# Patient Record
Sex: FEMALE | Race: WHITE | Employment: FULL TIME | URBAN - METROPOLITAN AREA
[De-identification: names, ages, dates, MRNs, and addresses within clinical notes are randomized per-mention and may not be internally consistent; named-entity substitution may affect disease eponyms.]

---

## 2017-05-31 ENCOUNTER — OFFICE VISIT (OUTPATIENT)
Dept: INTERNAL MEDICINE CLINIC | Facility: CLINIC | Age: 42
End: 2017-05-31

## 2017-05-31 VITALS
RESPIRATION RATE: 16 BRPM | HEIGHT: 70 IN | TEMPERATURE: 98 F | HEART RATE: 92 BPM | DIASTOLIC BLOOD PRESSURE: 74 MMHG | WEIGHT: 257 LBS | SYSTOLIC BLOOD PRESSURE: 112 MMHG | BODY MASS INDEX: 36.79 KG/M2

## 2017-05-31 DIAGNOSIS — Z86.711 HISTORY OF PULMONARY EMBOLUS (PE): ICD-10-CM

## 2017-05-31 DIAGNOSIS — F41.9 ANXIETY: ICD-10-CM

## 2017-05-31 DIAGNOSIS — G56.03 BILATERAL CARPAL TUNNEL SYNDROME: ICD-10-CM

## 2017-05-31 DIAGNOSIS — O24.419 GESTATIONAL DIABETES MELLITUS (GDM), ANTEPARTUM, GESTATIONAL DIABETES METHOD OF CONTROL UNSPECIFIED: ICD-10-CM

## 2017-05-31 DIAGNOSIS — R19.5 LOOSE STOOLS: ICD-10-CM

## 2017-05-31 DIAGNOSIS — R53.83 FATIGUE, UNSPECIFIED TYPE: ICD-10-CM

## 2017-05-31 DIAGNOSIS — Z00.00 ANNUAL PHYSICAL EXAM: Primary | ICD-10-CM

## 2017-05-31 PROCEDURE — 99386 PREV VISIT NEW AGE 40-64: CPT | Performed by: INTERNAL MEDICINE

## 2017-05-31 PROCEDURE — 99213 OFFICE O/P EST LOW 20 MIN: CPT | Performed by: INTERNAL MEDICINE

## 2017-05-31 RX ORDER — ALPRAZOLAM 0.25 MG/1
0.25 TABLET ORAL NIGHTLY PRN
Qty: 20 TABLET | Refills: 0 | Status: SHIPPED | OUTPATIENT
Start: 2017-05-31 | End: 2017-10-27

## 2017-05-31 RX ORDER — BUPROPION HYDROCHLORIDE 150 MG/1
150 TABLET ORAL DAILY
Qty: 30 TABLET | Refills: 0 | Status: SHIPPED | OUTPATIENT
Start: 2017-05-31 | End: 2017-10-16

## 2017-05-31 NOTE — PROGRESS NOTES
Jared Sigala is a 38year old female. Patient presents with:  Establish Care: Pt goes to [facility] for paps. Depression: Does not feel that her sertraline is working for her since last pregnancy.       HPI:   Jared Sigala is a 38year old female who pr reports loose BM every 3-4 days requiring imodium. Denies melena, hematochezia. This has been ongoing for 6 weeks. 5) reports L shoulder numbness ever since receiving a flu shot last year.      Wt Readings from Last 6 Encounters:  05/31/17 : 257 lb (116.5 vision  HEENT: denies nasal congestion, sinus pain, ST, sore throat  LUNGS: denies shortness of breath with exertion, cough or wheezing  BREAST: denies masses or nipple discharge  CARDIOVASCULAR: denies chest pain, pressure, or palpitations  GI: denies abd mouth daily. Dispense: 30 tablet; Refill: 0  - TSH W Reflex To Free T4 [E]; Future    4. Overweight  Discussed emphasis on protein and vegetable/fruit intake; cut down on carbohydrates (sweets, pasta, rice, tortillas, cereal, bread).  She will work on this

## 2017-06-03 ENCOUNTER — LAB ENCOUNTER (OUTPATIENT)
Dept: LAB | Facility: HOSPITAL | Age: 42
End: 2017-06-03
Attending: INTERNAL MEDICINE
Payer: COMMERCIAL

## 2017-06-03 DIAGNOSIS — F41.9 ANXIETY: ICD-10-CM

## 2017-06-03 DIAGNOSIS — Z00.00 ANNUAL PHYSICAL EXAM: ICD-10-CM

## 2017-06-03 DIAGNOSIS — R53.83 FATIGUE, UNSPECIFIED TYPE: ICD-10-CM

## 2017-06-03 DIAGNOSIS — O24.419 GESTATIONAL DIABETES MELLITUS (GDM), ANTEPARTUM, GESTATIONAL DIABETES METHOD OF CONTROL UNSPECIFIED: ICD-10-CM

## 2017-06-03 PROCEDURE — 85025 COMPLETE CBC W/AUTO DIFF WBC: CPT

## 2017-06-03 PROCEDURE — 36415 COLL VENOUS BLD VENIPUNCTURE: CPT

## 2017-06-03 PROCEDURE — 84443 ASSAY THYROID STIM HORMONE: CPT

## 2017-06-03 PROCEDURE — 80061 LIPID PANEL: CPT

## 2017-06-03 PROCEDURE — 80053 COMPREHEN METABOLIC PANEL: CPT

## 2017-06-04 ENCOUNTER — TELEPHONE (OUTPATIENT)
Dept: INTERNAL MEDICINE CLINIC | Facility: CLINIC | Age: 42
End: 2017-06-04

## 2017-06-05 NOTE — TELEPHONE ENCOUNTER
Please notify patient that her labs look good. Her cholesterol is high, but we did discuss making some changes, so this should get better.  Would cut back on red meats, fried/fatty foods, cheeses

## 2017-07-28 ENCOUNTER — OFFICE VISIT (OUTPATIENT)
Dept: INTERNAL MEDICINE CLINIC | Facility: CLINIC | Age: 42
End: 2017-07-28

## 2017-07-28 VITALS
WEIGHT: 253 LBS | HEIGHT: 70 IN | RESPIRATION RATE: 16 BRPM | TEMPERATURE: 99 F | HEART RATE: 88 BPM | DIASTOLIC BLOOD PRESSURE: 66 MMHG | BODY MASS INDEX: 36.22 KG/M2 | SYSTOLIC BLOOD PRESSURE: 122 MMHG

## 2017-07-28 DIAGNOSIS — E66.09 OBESITY DUE TO EXCESS CALORIES WITHOUT SERIOUS COMORBIDITY, UNSPECIFIED CLASSIFICATION: Primary | ICD-10-CM

## 2017-07-28 PROCEDURE — 99212 OFFICE O/P EST SF 10 MIN: CPT | Performed by: INTERNAL MEDICINE

## 2017-07-28 PROCEDURE — 99213 OFFICE O/P EST LOW 20 MIN: CPT | Performed by: INTERNAL MEDICINE

## 2017-07-28 NOTE — PROGRESS NOTES
Maryjane Parada is a 38year old female. Patient presents with:  Overweight: Patient present for follow up from       HPI:   Maryjane Parada is a 38year old female who presents for: follow up on weight management    States she did go on vacation twic Paternal Uncle 48      Social History:   Smoking status: Former Smoker                                                              Packs/day: 0.00      Years: 0.00         Types: Cigarettes     Quit date: 1/1/2008  Smokeless tobacco: Never Used

## 2017-09-05 ENCOUNTER — PATIENT MESSAGE (OUTPATIENT)
Dept: INTERNAL MEDICINE CLINIC | Facility: CLINIC | Age: 42
End: 2017-09-05

## 2017-09-05 NOTE — TELEPHONE ENCOUNTER
From: Jared Sigala  To: Yahaira Kevin DO  Sent: 9/5/2017 9:23 AM CDT  Subject: Prescription Question    Hi Dr. Amadou Nguyen,    I wanted to reach out because I started the change in my Zoloft about a month ago to transition to the Wellbutrin.  I dropped to 50mg

## 2017-10-16 DIAGNOSIS — F41.9 ANXIETY: ICD-10-CM

## 2017-10-17 NOTE — TELEPHONE ENCOUNTER
From: Reggie Yoder  Sent: 10/16/2017 7:26 PM CDT  Subject: Medication Renewal Request    Linda Bhatia would like a refill of the following medications:     BuPROPion HCl ER, XL, 150 MG Oral Tablet 24 Hr Ryan Carranza, DO]    Preferred pharmacy: [facility], [address]  [address] [place] [address], [phone], [phone]    [facility]

## 2017-10-18 ENCOUNTER — TELEPHONE (OUTPATIENT)
Dept: INTERNAL MEDICINE CLINIC | Facility: CLINIC | Age: 42
End: 2017-10-18

## 2017-10-19 RX ORDER — BUPROPION HYDROCHLORIDE 150 MG/1
150 TABLET ORAL DAILY
Qty: 30 TABLET | Refills: 0 | Status: SHIPPED
Start: 2017-10-19 | End: 2017-10-27

## 2017-10-19 NOTE — TELEPHONE ENCOUNTER
Pt called for status, pt will run out of medication tonight would like to  refill today  Pt uses [facility] as pharmacy  Tasked to [employer]

## 2017-10-27 ENCOUNTER — OFFICE VISIT (OUTPATIENT)
Dept: INTERNAL MEDICINE CLINIC | Facility: CLINIC | Age: 42
End: 2017-10-27

## 2017-10-27 VITALS
SYSTOLIC BLOOD PRESSURE: 102 MMHG | DIASTOLIC BLOOD PRESSURE: 70 MMHG | WEIGHT: 250 LBS | TEMPERATURE: 98 F | HEIGHT: 70 IN | OXYGEN SATURATION: 98 % | BODY MASS INDEX: 35.79 KG/M2 | HEART RATE: 89 BPM

## 2017-10-27 DIAGNOSIS — F41.9 ANXIETY: ICD-10-CM

## 2017-10-27 PROCEDURE — 99212 OFFICE O/P EST SF 10 MIN: CPT | Performed by: INTERNAL MEDICINE

## 2017-10-27 PROCEDURE — 99213 OFFICE O/P EST LOW 20 MIN: CPT | Performed by: INTERNAL MEDICINE

## 2017-10-27 RX ORDER — ALPRAZOLAM 0.25 MG/1
0.25 TABLET ORAL NIGHTLY PRN
Qty: 20 TABLET | Refills: 0 | Status: SHIPPED | OUTPATIENT
Start: 2017-10-27 | End: 2021-04-19 | Stop reason: DRUGHIGH

## 2017-10-27 RX ORDER — ESCITALOPRAM OXALATE 5 MG/1
TABLET ORAL
Qty: 60 TABLET | Refills: 0 | Status: SHIPPED | OUTPATIENT
Start: 2017-10-27 | End: 2018-01-10

## 2017-10-27 NOTE — PROGRESS NOTES
Vincent Mcmillan is a 38year old female. Patient presents with:  Checkup: 3 mo.  Pt has no complaints at this time      HPI:   Vincent Mcmillan is a 38year old female who presents for: 3 month check up    Does not feel like she is doing well on wellbutri Packs/day: 0.00      Years: 0.00         Types: Cigarettes     Quit date: 1/1/2008  Smokeless tobacco: Never Used                      Alcohol use:  Yes              Comment: 1-2 beers a week         REVIEW OF SYSTEMS:   GENERAL: feels wel <<----- Click to add NO pertinent Past Medical History No pertinent past medical history

## 2017-11-04 ENCOUNTER — HOSPITAL ENCOUNTER (OUTPATIENT)
Dept: MAMMOGRAPHY | Facility: HOSPITAL | Age: 42
Discharge: HOME | End: 2017-11-04
Attending: OBSTETRICS & GYNECOLOGY
Payer: COMMERCIAL

## 2017-11-04 ENCOUNTER — HOSPITAL ENCOUNTER (OUTPATIENT)
Dept: ULTRASOUND IMAGING | Facility: HOSPITAL | Age: 42
Discharge: HOME | End: 2017-11-04
Attending: OBSTETRICS & GYNECOLOGY
Payer: COMMERCIAL

## 2017-11-04 DIAGNOSIS — Z12.31 ENCOUNTER FOR SCREENING MAMMOGRAM FOR MALIGNANT NEOPLASM OF BREAST: ICD-10-CM

## 2017-11-04 DIAGNOSIS — N92.0 EXCESSIVE AND FREQUENT MENSTRUATION: ICD-10-CM

## 2017-11-04 PROCEDURE — 77067 SCR MAMMO BI INCL CAD: CPT | Performed by: OBSTETRICS & GYNECOLOGY

## 2017-11-04 PROCEDURE — 76856 US EXAM PELVIC COMPLETE: CPT | Performed by: OBSTETRICS & GYNECOLOGY

## 2017-11-04 PROCEDURE — 76830 TRANSVAGINAL US NON-OB: CPT | Performed by: OBSTETRICS & GYNECOLOGY

## 2017-11-24 ENCOUNTER — HOSPITAL ENCOUNTER (OUTPATIENT)
Dept: MAMMOGRAPHY | Facility: HOSPITAL | Age: 42
Discharge: HOME | End: 2017-11-24
Attending: OBSTETRICS & GYNECOLOGY
Payer: COMMERCIAL

## 2017-11-24 DIAGNOSIS — R92.8 ABNORMAL MAMMOGRAM: ICD-10-CM

## 2017-11-24 PROCEDURE — 77065 DX MAMMO INCL CAD UNI: CPT | Performed by: OBSTETRICS & GYNECOLOGY

## 2018-01-08 ENCOUNTER — PATIENT MESSAGE (OUTPATIENT)
Dept: INTERNAL MEDICINE CLINIC | Facility: CLINIC | Age: 43
End: 2018-01-08

## 2018-01-09 NOTE — TELEPHONE ENCOUNTER
From: Jennifer Eaton  To: Keisha Erwin DO  Sent: 1/8/2018 10:30 PM CST  Subject: Prescription Question    Hi Dr. Elva Meier,    I wanted to follow up with you regarding the prescription change from Wellbutrin to Lexapro.    Dr. Earline Buck also put me on the [facility]

## 2018-01-10 ENCOUNTER — PATIENT MESSAGE (OUTPATIENT)
Dept: INTERNAL MEDICINE CLINIC | Facility: CLINIC | Age: 43
End: 2018-01-10

## 2018-01-10 RX ORDER — ESCITALOPRAM OXALATE 10 MG/1
10 TABLET ORAL DAILY
Qty: 90 TABLET | Refills: 3 | Status: SHIPPED | OUTPATIENT
Start: 2018-01-10 | End: 2019-05-08 | Stop reason: ALTCHOICE

## 2018-01-10 NOTE — TELEPHONE ENCOUNTER
From: Nafisa Sharp  To: Fiorella Gonzalez DO  Sent: 1/10/2018 1:04 PM CST  Subject: Prescription Question    Hi Dr. Mara Cervantes,    I have just been taking the 5mg. once a day. Trying to remember to take anything twice a day would be a challenge.     Saida Andrade

## 2018-01-18 ENCOUNTER — TELEPHONE (OUTPATIENT)
Dept: INTERNAL MEDICINE CLINIC | Facility: CLINIC | Age: 43
End: 2018-01-18

## 2018-01-19 NOTE — TELEPHONE ENCOUNTER
Pt returned call, left message on answering machine  Uses [facility] as pharmacy  Please call at [phone]  Tasked to [address] [place] [address]

## 2018-04-25 ENCOUNTER — TELEPHONE (OUTPATIENT)
Dept: INTERNAL MEDICINE CLINIC | Facility: CLINIC | Age: 43
End: 2018-04-25

## 2018-04-25 RX ORDER — AZITHROMYCIN 250 MG/1
TABLET, FILM COATED ORAL
Qty: 6 TABLET | Refills: 0 | Status: SHIPPED | OUTPATIENT
Start: 2018-04-25 | End: 2019-05-08 | Stop reason: ALTCHOICE

## 2018-04-25 NOTE — TELEPHONE ENCOUNTER
Please advise - called patient who stated she started with congestion about 2 weeks  Ago , now traveling into chest with green phlegm , denies fever, just scratchy throat . She is leaving for [employer] in a few days - to DR. Maria M Moreno

## 2018-04-25 NOTE — TELEPHONE ENCOUNTER
Pt. Called stating she is very congested. Coughing up green phlegm. Not feeling well. Leaving is a few days for [employer]. Asking if there is something she can take. Can Dr. Karoline Merlin? She uses [facility] in [place]. Pt. Can be reached at [phone].

## 2018-12-22 ENCOUNTER — HOSPITAL ENCOUNTER (OUTPATIENT)
Dept: MAMMOGRAPHY | Facility: HOSPITAL | Age: 43
Discharge: HOME | End: 2018-12-22
Attending: OBSTETRICS & GYNECOLOGY
Payer: COMMERCIAL

## 2018-12-22 DIAGNOSIS — Z12.31 ENCOUNTER FOR SCREENING MAMMOGRAM FOR MALIGNANT NEOPLASM OF BREAST: ICD-10-CM

## 2018-12-22 PROCEDURE — 77063 BREAST TOMOSYNTHESIS BI: CPT | Performed by: OBSTETRICS & GYNECOLOGY

## 2018-12-22 PROCEDURE — 77067 SCR MAMMO BI INCL CAD: CPT | Performed by: OBSTETRICS & GYNECOLOGY

## 2019-05-04 ENCOUNTER — APPOINTMENT (OUTPATIENT)
Dept: GENERAL RADIOLOGY | Facility: HOSPITAL | Age: 44
End: 2019-05-04
Payer: COMMERCIAL

## 2019-05-04 ENCOUNTER — HOSPITAL ENCOUNTER (EMERGENCY)
Facility: HOSPITAL | Age: 44
Discharge: HOME | End: 2019-05-04
Attending: EMERGENCY MEDICINE
Payer: COMMERCIAL

## 2019-05-04 VITALS
SYSTOLIC BLOOD PRESSURE: 143 MMHG | RESPIRATION RATE: 18 BRPM | WEIGHT: 260 LBS | HEIGHT: 70 IN | HEART RATE: 104 BPM | DIASTOLIC BLOOD PRESSURE: 86 MMHG | BODY MASS INDEX: 37.22 KG/M2 | OXYGEN SATURATION: 97 % | TEMPERATURE: 98 F

## 2019-05-04 DIAGNOSIS — S92.354A CLOSED NONDISPLACED FRACTURE OF FIFTH METATARSAL BONE OF RIGHT FOOT, INITIAL ENCOUNTER: Primary | ICD-10-CM

## 2019-05-04 PROCEDURE — 99284 EMERGENCY DEPT VISIT MOD MDM: CPT

## 2019-05-04 PROCEDURE — 28470 CLTX METATARSAL FX WO MNP EA: CPT

## 2019-05-04 PROCEDURE — 73630 X-RAY EXAM OF FOOT: CPT | Performed by: EMERGENCY MEDICINE

## 2019-05-04 RX ORDER — LAMOTRIGINE 25 MG/1
25 TABLET ORAL DAILY
COMMUNITY
End: 2020-02-14

## 2019-05-04 RX ORDER — ACETAMINOPHEN 500 MG
1000 TABLET ORAL ONCE
Status: COMPLETED | OUTPATIENT
Start: 2019-05-04 | End: 2019-05-04

## 2019-05-04 RX ORDER — SERTRALINE HYDROCHLORIDE 100 MG/1
100 TABLET, FILM COATED ORAL DAILY
COMMUNITY

## 2019-05-07 ENCOUNTER — TELEPHONE (OUTPATIENT)
Dept: PODIATRY CLINIC | Facility: CLINIC | Age: 44
End: 2019-05-07

## 2019-05-07 ENCOUNTER — OFFICE VISIT (OUTPATIENT)
Dept: PODIATRY CLINIC | Facility: CLINIC | Age: 44
End: 2019-05-07
Payer: COMMERCIAL

## 2019-05-07 DIAGNOSIS — S92.354A CLOSED NONDISPLACED FRACTURE OF FIFTH METATARSAL BONE OF RIGHT FOOT, INITIAL ENCOUNTER: Primary | ICD-10-CM

## 2019-05-07 PROCEDURE — 99212 OFFICE O/P EST SF 10 MIN: CPT | Performed by: PODIATRIST

## 2019-05-07 PROCEDURE — 99203 OFFICE O/P NEW LOW 30 MIN: CPT | Performed by: PODIATRIST

## 2019-05-07 NOTE — PROGRESS NOTES
HPI:    Patient ID: Leah Mejias is a 41year old female. This 82-year-old female presents as a new patient to me on referral from the emergency room. Patient is here because some she is been told that she has a fracture of her right foot.   History right foot. Based on appropriate location and position we will keep this patient in the posterior splint with nonweightbearing utilizing crutches for an additional week.   When I see her in a week I anticipate removing this dressing and then place her in a

## 2019-05-07 NOTE — TELEPHONE ENCOUNTER
Pt was seen today, states big toe looks black/blue and could barely move toe, states an hour ago she was able to move toe, states this was not addressed at [address] Pt requesting to speak to RN. Pls call thank you.

## 2019-05-07 NOTE — TELEPHONE ENCOUNTER
S/w pt and she states after she left appt she started to have pain in right hallux 9/10 if she moves the toe upward. She states there is some bruising at the base of the 2nd, 3rd, 4th toes. She states mild swelling.  She is in splint from ER and loosened up

## 2019-05-08 ENCOUNTER — TELEPHONE (OUTPATIENT)
Dept: PODIATRY CLINIC | Facility: CLINIC | Age: 44
End: 2019-05-08

## 2019-05-08 ENCOUNTER — OFFICE VISIT (OUTPATIENT)
Dept: PODIATRY CLINIC | Facility: CLINIC | Age: 44
End: 2019-05-08
Payer: COMMERCIAL

## 2019-05-08 DIAGNOSIS — S92.354D CLOSED NONDISPLACED FRACTURE OF FIFTH METATARSAL BONE OF RIGHT FOOT WITH ROUTINE HEALING, SUBSEQUENT ENCOUNTER: Primary | ICD-10-CM

## 2019-05-08 PROCEDURE — 99213 OFFICE O/P EST LOW 20 MIN: CPT | Performed by: PODIATRIST

## 2019-05-08 PROCEDURE — L4386 NON-PNEUM WALK BOOT PRE CST: HCPCS | Performed by: PODIATRIST

## 2019-05-08 PROCEDURE — 99212 OFFICE O/P EST SF 10 MIN: CPT | Performed by: PODIATRIST

## 2019-05-08 RX ORDER — ASPIRIN 325 MG
TABLET ORAL
COMMUNITY
End: 2019-05-08

## 2019-05-08 RX ORDER — METHYLPREDNISOLONE 4 MG/1
TABLET ORAL
Qty: 1 PACKAGE | Refills: 0 | Status: SHIPPED | OUTPATIENT
Start: 2019-05-08 | End: 2020-02-14

## 2019-05-08 NOTE — TELEPHONE ENCOUNTER
Pt called and states right big toe pain is worse today, some swelling. She tried taking advil, icing and elevating and not helping. Offered appt today @ 1010am [facility]. SCR notified.

## 2019-05-08 NOTE — TELEPHONE ENCOUNTER
The rx was sent a couple hours ago? You may give her  a note but pleas be certain that it indicates that he is caring for his wife.  Thank you scr

## 2019-05-08 NOTE — TELEPHONE ENCOUNTER
Pt states she went to pharmacy and rx SCR was supposed to be prescribed was not there. Also states spouse is taking family leave to assist pt due to broken foot, asking for note for work to excuse him until 5/14. Pls advise thank you.

## 2019-05-08 NOTE — TELEPHONE ENCOUNTER
S/w pt and she rc'd medrol raz. Informed her I would send her letter through [employer] for her spouse fredy.

## 2019-05-08 NOTE — PROGRESS NOTES
HPI:    Patient ID: Moreno Ibarra is a 41year old female. 46-year-old female called last evening and then again this morning with pain associated with the right great toe.   She is not aware that it was a problem when I saw her earlier in the day yest metatarsophalangeal joint she has discomfort in that immediate area. I did review x-ray and see no apparent bone pathology.   I fitted this patient for a cam walker and encouraged partial weightbearing with crutches and a cam walker and instructed icing an

## 2019-05-09 ENCOUNTER — TELEPHONE (OUTPATIENT)
Dept: PODIATRY CLINIC | Facility: CLINIC | Age: 44
End: 2019-05-09

## 2019-05-09 RX ORDER — HYDROCODONE BITARTRATE AND ACETAMINOPHEN 5; 325 MG/1; MG/1
1 TABLET ORAL EVERY 4 HOURS PRN
Qty: 20 TABLET | Refills: 0 | Status: SHIPPED | OUTPATIENT
Start: 2019-05-09 | End: 2020-02-14

## 2019-05-09 NOTE — TELEPHONE ENCOUNTER
Per [facility] request a Rx for Norco 5 mg # 20 was printed out and signed by [facility] and awaits patient  to pick it up at the [place] office [address].  Nory Martinez

## 2019-05-09 NOTE — TELEPHONE ENCOUNTER
Spoke with Mady Ly and informed her of the signed Rx and she stated her  Diana Bedoya will  the Rx this morning, she was told that he will need a picture ID to  the Rx and she verbalized understanding .  She stated she will start the medrol dose pa

## 2019-05-09 NOTE — ED PROVIDER NOTES
Patient Seen in: [facility] AND [facility] Emergency Department    History   Patient presents with: Foot Injury    Stated Complaint: right foot injury    HPI    57-year-old female presents for evaluation of right foot pain.   Patient reports she missed stopped a normal. No respiratory distress. Musculoskeletal:   Tender at the base of the fifth metatarsal   Neurological: She is alert and oriented to person, place, and time. No sensory deficit. No focal deficit   Skin: Skin is warm and dry.  Capillary refill avila Considerations: Fracture, dislocation, contusion, sprain    Limitations of history:   able to obtain history from patient  Factors limiting our ability to obtain a history: None    Medical Record Review: I personally reviewed available prior medical record

## 2019-05-20 ENCOUNTER — TELEPHONE (OUTPATIENT)
Dept: PODIATRY CLINIC | Facility: CLINIC | Age: 44
End: 2019-05-20

## 2019-05-20 NOTE — TELEPHONE ENCOUNTER
S/w pt and she states the medrol raz helped her great toe pain.  She has been putting partial weight in boot the past 3 days and it has caused increase in throbbing pain 6/10, she still has moderate swelling in ankle and wondering if that is normal? Pt has

## 2019-05-20 NOTE — TELEPHONE ENCOUNTER
S/w SCR and he states if pt having pain with partial WB to be NWB for a couple days and then try again. Called pt and informed her per [employer] directions and she will CB if she needs a work note.

## 2019-05-20 NOTE — TELEPHONE ENCOUNTER
The swelling is assoc with the fracture. If she is not able to get to work she may have a note to renin out for another week or 2 as needed.  scr

## 2019-05-20 NOTE — TELEPHONE ENCOUNTER
Pt states she has some swelling in her foot and has some pain from putting a little pressure on her foot.  States she wants to make sure swelling and pain is normal. Also states she has questions about when she can walk and needs some info about when she ca

## 2019-05-31 ENCOUNTER — OFFICE VISIT (OUTPATIENT)
Dept: PODIATRY CLINIC | Facility: CLINIC | Age: 44
End: 2019-05-31
Payer: COMMERCIAL

## 2019-05-31 ENCOUNTER — HOSPITAL ENCOUNTER (OUTPATIENT)
Dept: GENERAL RADIOLOGY | Facility: HOSPITAL | Age: 44
Discharge: HOME | End: 2019-05-31
Attending: PODIATRIST
Payer: COMMERCIAL

## 2019-05-31 DIAGNOSIS — Z47.89 ORTHOPEDIC AFTERCARE: ICD-10-CM

## 2019-05-31 DIAGNOSIS — S92.354D CLOSED NONDISPLACED FRACTURE OF FIFTH METATARSAL BONE OF RIGHT FOOT WITH ROUTINE HEALING, SUBSEQUENT ENCOUNTER: ICD-10-CM

## 2019-05-31 DIAGNOSIS — Z47.89 ORTHOPEDIC AFTERCARE: Primary | ICD-10-CM

## 2019-05-31 PROCEDURE — 99212 OFFICE O/P EST SF 10 MIN: CPT | Performed by: PODIATRIST

## 2019-05-31 PROCEDURE — 99213 OFFICE O/P EST LOW 20 MIN: CPT | Performed by: PODIATRIST

## 2019-05-31 PROCEDURE — 73630 X-RAY EXAM OF FOOT: CPT | Performed by: PODIATRIST

## 2019-05-31 NOTE — PROGRESS NOTES
HPI:    Patient ID: Bolivar Roberts is a 45year old female. This 53-year-old female presents for follow-up in reference to the fracture of the fifth metatarsal base of the right foot. Patient is doing extremely well and progressing nicely.     ROS: Prescriptions      No prescriptions requested or ordered in this encounter       Imaging & Referrals:  None       CC#[id number]

## 2019-06-14 ENCOUNTER — HOSPITAL ENCOUNTER (OUTPATIENT)
Dept: GENERAL RADIOLOGY | Facility: HOSPITAL | Age: 44
Discharge: HOME | End: 2019-06-14
Attending: PODIATRIST
Payer: COMMERCIAL

## 2019-06-14 ENCOUNTER — OFFICE VISIT (OUTPATIENT)
Dept: PODIATRY CLINIC | Facility: CLINIC | Age: 44
End: 2019-06-14
Payer: COMMERCIAL

## 2019-06-14 DIAGNOSIS — T14.8XXA FRACTURE: ICD-10-CM

## 2019-06-14 DIAGNOSIS — T14.8XXA FRACTURE: Primary | ICD-10-CM

## 2019-06-14 DIAGNOSIS — S92.354D CLOSED NONDISPLACED FRACTURE OF FIFTH METATARSAL BONE OF RIGHT FOOT WITH ROUTINE HEALING, SUBSEQUENT ENCOUNTER: ICD-10-CM

## 2019-06-14 PROCEDURE — 99212 OFFICE O/P EST SF 10 MIN: CPT | Performed by: PODIATRIST

## 2019-06-14 PROCEDURE — 73630 X-RAY EXAM OF FOOT: CPT | Performed by: PODIATRIST

## 2019-06-14 PROCEDURE — 99213 OFFICE O/P EST LOW 20 MIN: CPT | Performed by: PODIATRIST

## 2019-06-14 NOTE — PROGRESS NOTES
HPI:    Patient ID: Jabari Rashid is a 45year old female. This 17-year-old female presents for follow-up in reference to the fracture of the fifth metatarsal base right foot.   Patient has minimal discomfort and is extremely active while wearing the c

## 2019-06-21 ENCOUNTER — OFFICE VISIT (OUTPATIENT)
Dept: PODIATRY CLINIC | Facility: CLINIC | Age: 44
End: 2019-06-21
Payer: COMMERCIAL

## 2019-06-21 DIAGNOSIS — S92.354D CLOSED NONDISPLACED FRACTURE OF FIFTH METATARSAL BONE OF RIGHT FOOT WITH ROUTINE HEALING, SUBSEQUENT ENCOUNTER: Primary | ICD-10-CM

## 2019-06-21 PROCEDURE — 99213 OFFICE O/P EST LOW 20 MIN: CPT | Performed by: PODIATRIST

## 2019-06-21 PROCEDURE — 99212 OFFICE O/P EST SF 10 MIN: CPT | Performed by: PODIATRIST

## 2019-06-21 RX ORDER — MELOXICAM 15 MG/1
TABLET ORAL
Qty: 30 TABLET | Refills: 1 | Status: SHIPPED | OUTPATIENT
Start: 2019-06-21 | End: 2020-02-14

## 2019-06-21 NOTE — PROGRESS NOTES
HPI:    Patient ID: Uri Suarez is a 45year old female. This 43-year-old female presents for follow-up in reference to continued pain and swelling of the right foot. Most of her discomfort is general its nonspecific.   Patient fractured the base of ASSESSMENT/PLAN:   Closed nondisplaced fracture of fifth metatarsal bone of right foot with routine healing, subsequent encounter  (primary encounter diagnosis)    No orders of the defined types were placed in this encounter.       Meds This Visit:  [facility]

## 2019-07-05 ENCOUNTER — HOSPITAL ENCOUNTER (OUTPATIENT)
Dept: GENERAL RADIOLOGY | Facility: HOSPITAL | Age: 44
Discharge: HOME | End: 2019-07-05
Attending: PODIATRIST
Payer: COMMERCIAL

## 2019-07-05 ENCOUNTER — OFFICE VISIT (OUTPATIENT)
Dept: PODIATRY CLINIC | Facility: CLINIC | Age: 44
End: 2019-07-05
Payer: COMMERCIAL

## 2019-07-05 DIAGNOSIS — Z47.89 ORTHOPEDIC AFTERCARE: ICD-10-CM

## 2019-07-05 DIAGNOSIS — S92.354D CLOSED NONDISPLACED FRACTURE OF FIFTH METATARSAL BONE OF RIGHT FOOT WITH ROUTINE HEALING, SUBSEQUENT ENCOUNTER: ICD-10-CM

## 2019-07-05 DIAGNOSIS — Z47.89 ORTHOPEDIC AFTERCARE: Primary | ICD-10-CM

## 2019-07-05 PROCEDURE — 99213 OFFICE O/P EST LOW 20 MIN: CPT | Performed by: PODIATRIST

## 2019-07-05 PROCEDURE — 73630 X-RAY EXAM OF FOOT: CPT | Performed by: PODIATRIST

## 2019-07-05 PROCEDURE — 73610 X-RAY EXAM OF ANKLE: CPT | Performed by: PODIATRIST

## 2019-07-05 NOTE — PROGRESS NOTES
HPI:    Patient ID: Earline Franco is a 45year old female. This 57-year-old female presents for follow-up in reference to the right foot and ankle.   Patient had a fracture of the fifth metatarsal base that clearly is healed but its left her with some evidence of fractures the base of the fifth metatarsal is not tender and healing of the fracture is noted. I am not able to demonstrate any apparent osseous pathology. Still my sense that she has soft tissue frustrations due to the injury.   I encouraged

## 2019-08-02 ENCOUNTER — LAB ENCOUNTER (OUTPATIENT)
Dept: LAB | Facility: HOSPITAL | Age: 44
End: 2019-08-02
Attending: INTERNAL MEDICINE
Payer: COMMERCIAL

## 2019-08-02 DIAGNOSIS — Z00.00 ROUTINE MEDICAL EXAM: Primary | ICD-10-CM

## 2019-08-02 LAB
ALBUMIN SERPL-MCNC: 3.9 G/DL (ref 3.4–5)
ALBUMIN/GLOB SERPL: 1.1 {RATIO} (ref 1–2)
ALP LIVER SERPL-CCNC: 67 U/L (ref 37–98)
ALT SERPL-CCNC: 20 U/L (ref 13–56)
ANION GAP SERPL CALC-SCNC: 5 MMOL/L (ref 0–18)
AST SERPL-CCNC: 18 U/L (ref 15–37)
BASOPHILS # BLD AUTO: 0.04 X10(3) UL (ref 0–0.2)
BASOPHILS NFR BLD AUTO: 0.6 %
BILIRUB SERPL-MCNC: 0.5 MG/DL (ref 0.1–2)
BUN BLD-MCNC: 16 MG/DL (ref 7–18)
BUN/CREAT SERPL: 16.5 (ref 10–20)
CALCIUM BLD-MCNC: 8.7 MG/DL (ref 8.5–10.1)
CHLORIDE SERPL-SCNC: 108 MMOL/L (ref 98–112)
CHOLEST SMN-MCNC: 210 MG/DL (ref ?–200)
CO2 SERPL-SCNC: 28 MMOL/L (ref 21–32)
CREAT BLD-MCNC: 0.97 MG/DL (ref 0.55–1.02)
DEPRECATED RDW RBC AUTO: 40.8 FL (ref 35.1–46.3)
EOSINOPHIL # BLD AUTO: 0.1 X10(3) UL (ref 0–0.7)
EOSINOPHIL NFR BLD AUTO: 1.6 %
ERYTHROCYTE [DISTWIDTH] IN BLOOD BY AUTOMATED COUNT: 12.9 % (ref 11–15)
EST. AVERAGE GLUCOSE BLD GHB EST-MCNC: 103 MG/DL (ref 68–126)
GLOBULIN PLAS-MCNC: 3.7 G/DL (ref 2.8–4.4)
GLUCOSE BLD-MCNC: 91 MG/DL (ref 70–99)
HBA1C MFR BLD HPLC: 5.2 % (ref ?–5.7)
HCT VFR BLD AUTO: 44.9 % (ref 35–48)
HDLC SERPL-MCNC: 60 MG/DL (ref 40–59)
HGB BLD-MCNC: 14.8 G/DL (ref 12–16)
IMM GRANULOCYTES # BLD AUTO: 0.02 X10(3) UL (ref 0–1)
IMM GRANULOCYTES NFR BLD: 0.3 %
LDLC SERPL CALC-MCNC: 129 MG/DL (ref ?–100)
LYMPHOCYTES # BLD AUTO: 1.24 X10(3) UL (ref 1–4)
LYMPHOCYTES NFR BLD AUTO: 20 %
M PROTEIN MFR SERPL ELPH: 7.6 G/DL (ref 6.4–8.2)
MCH RBC QN AUTO: 28.7 PG (ref 26–34)
MCHC RBC AUTO-ENTMCNC: 33 G/DL (ref 31–37)
MCV RBC AUTO: 87 FL (ref 80–100)
MONOCYTES # BLD AUTO: 0.45 X10(3) UL (ref 0.1–1)
MONOCYTES NFR BLD AUTO: 7.2 %
NEUTROPHILS # BLD AUTO: 4.36 X10 (3) UL (ref 1.5–7.7)
NEUTROPHILS # BLD AUTO: 4.36 X10(3) UL (ref 1.5–7.7)
NEUTROPHILS NFR BLD AUTO: 70.3 %
NONHDLC SERPL-MCNC: 150 MG/DL (ref ?–130)
OSMOLALITY SERPL CALC.SUM OF ELEC: 293 MOSM/KG (ref 275–295)
PATIENT FASTING: YES
PATIENT FASTING: YES
PLATELET # BLD AUTO: 271 10(3)UL (ref 150–450)
POTASSIUM SERPL-SCNC: 3.9 MMOL/L (ref 3.5–5.1)
RBC # BLD AUTO: 5.16 X10(6)UL (ref 3.8–5.3)
SODIUM SERPL-SCNC: 141 MMOL/L (ref 136–145)
TRIGL SERPL-MCNC: 104 MG/DL (ref 30–149)
VLDLC SERPL CALC-MCNC: 21 MG/DL (ref 0–30)
WBC # BLD AUTO: 6.2 X10(3) UL (ref 4–11)

## 2019-08-02 PROCEDURE — 36415 COLL VENOUS BLD VENIPUNCTURE: CPT

## 2019-08-02 PROCEDURE — 80053 COMPREHEN METABOLIC PANEL: CPT

## 2019-08-02 PROCEDURE — 80061 LIPID PANEL: CPT

## 2019-08-02 PROCEDURE — 83036 HEMOGLOBIN GLYCOSYLATED A1C: CPT

## 2019-08-02 PROCEDURE — 85025 COMPLETE CBC W/AUTO DIFF WBC: CPT

## 2019-12-04 ENCOUNTER — ORDER TRANSCRIPTION (OUTPATIENT)
Dept: ADMINISTRATIVE | Facility: HOSPITAL | Age: 44
End: 2019-12-04

## 2019-12-04 DIAGNOSIS — Z12.31 ENCOUNTER FOR SCREENING MAMMOGRAM FOR MALIGNANT NEOPLASM OF BREAST: Primary | ICD-10-CM

## 2019-12-21 ENCOUNTER — HOSPITAL ENCOUNTER (OUTPATIENT)
Dept: MAMMOGRAPHY | Age: 44
Discharge: HOME | End: 2019-12-21
Attending: OBSTETRICS & GYNECOLOGY
Payer: COMMERCIAL

## 2019-12-21 DIAGNOSIS — Z12.31 ENCOUNTER FOR SCREENING MAMMOGRAM FOR MALIGNANT NEOPLASM OF BREAST: ICD-10-CM

## 2019-12-21 PROCEDURE — 77063 BREAST TOMOSYNTHESIS BI: CPT | Performed by: OBSTETRICS & GYNECOLOGY

## 2019-12-21 PROCEDURE — 77067 SCR MAMMO BI INCL CAD: CPT | Performed by: OBSTETRICS & GYNECOLOGY

## 2019-12-26 ENCOUNTER — LAB ENCOUNTER (OUTPATIENT)
Dept: LAB | Facility: HOSPITAL | Age: 44
End: 2019-12-26
Attending: INTERNAL MEDICINE
Payer: COMMERCIAL

## 2019-12-26 DIAGNOSIS — N91.2 ABSENCE OF MENSTRUATION: ICD-10-CM

## 2019-12-26 DIAGNOSIS — R10.13 EPIGASTRIC PAIN: Primary | ICD-10-CM

## 2019-12-26 PROCEDURE — 84443 ASSAY THYROID STIM HORMONE: CPT

## 2019-12-26 PROCEDURE — 82784 ASSAY IGA/IGD/IGG/IGM EACH: CPT

## 2019-12-26 PROCEDURE — 83516 IMMUNOASSAY NONANTIBODY: CPT

## 2019-12-26 PROCEDURE — 36415 COLL VENOUS BLD VENIPUNCTURE: CPT

## 2019-12-26 PROCEDURE — 84146 ASSAY OF PROLACTIN: CPT

## 2020-02-14 ENCOUNTER — OFFICE VISIT (OUTPATIENT)
Dept: FAMILY MEDICINE CLINIC | Facility: CLINIC | Age: 45
End: 2020-02-14
Payer: COMMERCIAL

## 2020-02-14 VITALS
OXYGEN SATURATION: 98 % | DIASTOLIC BLOOD PRESSURE: 78 MMHG | BODY MASS INDEX: 38.22 KG/M2 | TEMPERATURE: 98 F | WEIGHT: 267 LBS | SYSTOLIC BLOOD PRESSURE: 119 MMHG | HEIGHT: 70 IN | HEART RATE: 86 BPM | RESPIRATION RATE: 16 BRPM

## 2020-02-14 DIAGNOSIS — H69.82 EUSTACHIAN TUBE DYSFUNCTION, LEFT: Primary | ICD-10-CM

## 2020-02-14 PROBLEM — H93.8X2: Status: ACTIVE | Noted: 2020-02-14

## 2020-02-14 PROCEDURE — 99213 OFFICE O/P EST LOW 20 MIN: CPT | Performed by: NURSE PRACTITIONER

## 2020-02-14 RX ORDER — METHYLPREDNISOLONE 4 MG/1
TABLET ORAL
Qty: 1 KIT | Refills: 0 | Status: SHIPPED | OUTPATIENT
Start: 2020-02-14 | End: 2021-04-03 | Stop reason: ALTCHOICE

## 2020-02-14 RX ORDER — LAMOTRIGINE 100 MG/1
50 TABLET ORAL 2 TIMES DAILY
COMMUNITY
Start: 2019-12-20

## 2020-02-14 NOTE — PROGRESS NOTES
Earline Franco is a 45year old female who presents for  left ear sensation: at times is tender, at times feel full and at times feel like water is in her ear, right ear has itchy sensation. Problem has been occurring of and on for  6  months.  Symptoms • No Known Problems Paternal Cousin Male    • Breast Cancer Neg    • Ovarian Cancer Neg    • DCIS Neg    • LCIS Neg    • BRCA gene + Neg    • Ashkenazi Mormon Descent Neg       Social History    Tobacco Use      Smoking status: Former Smoker        Types: Iain Pearson is a 45year old female who presents with Eustachian tube dysfunction which comes and goes over the last 6 months, will treat with Sudafed 180 by mouth for 3 days and oral steroid. Pt does have mold allergy which she has not treated I the It often takes from several weeks up to 3 months for the fluid to clear on its own. Oral pain relievers and ear drops help if there is pain. Decongestants and antihistamines sometimes help. Antibiotics don't help since there is no infection.  Your doctor ma · Fever of 100.4°F (38°C) or higher, or as directed by your healthcare provider  · Fluid or blood draining from the ear  · Headache or sinus pain  · Stiff neck  · Unusual drowsiness or confusion  Date Last Reviewed: 10/1/2016  © 3923-4378 [employer] [facility] [employer]

## 2021-02-13 ENCOUNTER — HOSPITAL ENCOUNTER (OUTPATIENT)
Dept: MAMMOGRAPHY | Facility: HOSPITAL | Age: 46
Discharge: HOME | End: 2021-02-13
Attending: OBSTETRICS & GYNECOLOGY
Payer: COMMERCIAL

## 2021-02-13 DIAGNOSIS — Z12.31 ENCOUNTER FOR SCREENING MAMMOGRAM FOR MALIGNANT NEOPLASM OF BREAST: ICD-10-CM

## 2021-02-13 PROCEDURE — 77063 BREAST TOMOSYNTHESIS BI: CPT | Performed by: OBSTETRICS & GYNECOLOGY

## 2021-02-13 PROCEDURE — 77067 SCR MAMMO BI INCL CAD: CPT | Performed by: OBSTETRICS & GYNECOLOGY

## 2021-04-03 ENCOUNTER — OFFICE VISIT (OUTPATIENT)
Dept: ALLERGY | Facility: CLINIC | Age: 46
End: 2021-04-03
Payer: COMMERCIAL

## 2021-04-03 ENCOUNTER — NURSE ONLY (OUTPATIENT)
Dept: ALLERGY | Facility: CLINIC | Age: 46
End: 2021-04-03
Payer: COMMERCIAL

## 2021-04-03 VITALS
DIASTOLIC BLOOD PRESSURE: 89 MMHG | RESPIRATION RATE: 18 BRPM | OXYGEN SATURATION: 97 % | SYSTOLIC BLOOD PRESSURE: 147 MMHG | HEART RATE: 100 BPM | TEMPERATURE: 98 F

## 2021-04-03 DIAGNOSIS — J30.89 ENVIRONMENTAL AND SEASONAL ALLERGIES: ICD-10-CM

## 2021-04-03 DIAGNOSIS — Z91.018 FOOD ALLERGY: ICD-10-CM

## 2021-04-03 DIAGNOSIS — J30.9 ALLERGIC RHINITIS, UNSPECIFIED SEASONALITY, UNSPECIFIED TRIGGER: Primary | ICD-10-CM

## 2021-04-03 DIAGNOSIS — K90.49 ALCOHOL INTOLERANCE: ICD-10-CM

## 2021-04-03 PROCEDURE — 3077F SYST BP >= 140 MM HG: CPT | Performed by: ALLERGY & IMMUNOLOGY

## 2021-04-03 PROCEDURE — 95024 IQ TESTS W/ALLERGENIC XTRCS: CPT | Performed by: ALLERGY & IMMUNOLOGY

## 2021-04-03 PROCEDURE — 95004 PERQ TESTS W/ALRGNC XTRCS: CPT | Performed by: ALLERGY & IMMUNOLOGY

## 2021-04-03 PROCEDURE — 3079F DIAST BP 80-89 MM HG: CPT | Performed by: ALLERGY & IMMUNOLOGY

## 2021-04-03 PROCEDURE — 99244 OFF/OP CNSLTJ NEW/EST MOD 40: CPT | Performed by: ALLERGY & IMMUNOLOGY

## 2021-04-03 RX ORDER — FLUTICASONE PROPIONATE 50 MCG
2 SPRAY, SUSPENSION (ML) NASAL DAILY
Qty: 1 BOTTLE | Refills: 0 | Status: SHIPPED | OUTPATIENT
Start: 2021-04-03

## 2021-04-03 RX ORDER — ESOMEPRAZOLE MAGNESIUM 40 MG/1
CAPSULE, DELAYED RELEASE ORAL
COMMUNITY
Start: 2020-11-09

## 2021-04-03 RX ORDER — ALBUTEROL SULFATE 90 UG/1
2 AEROSOL, METERED RESPIRATORY (INHALATION) EVERY 6 HOURS PRN
Qty: 1 INHALER | Refills: 0 | Status: SHIPPED | OUTPATIENT
Start: 2021-04-03

## 2021-04-03 RX ORDER — LEVOCETIRIZINE DIHYDROCHLORIDE 5 MG/1
5 TABLET, FILM COATED ORAL NIGHTLY
Qty: 30 TABLET | Refills: 0 | Status: SHIPPED | OUTPATIENT
Start: 2021-04-03

## 2021-04-03 NOTE — PATIENT INSTRUCTIONS
1. Ar  Handouts on allergies and avoidance measures provided and reviewed including the potential treatment option of immunotherapy  Start trial of Xyzal/levocetirizine 5 mg once a night at bedtime as a nonsedating antihistamine if having runny nose sneezi

## 2021-04-03 NOTE — PROGRESS NOTES
Isai Shelby is a 39year old female. HPI:   Patient presents with:  New Patient: Referred by Dr. Jose Suarez for environmental and food allergy testing due to GI upset.  She is having problems with cereal and alcohol (craft beer, now seltzer drinks wi Paternal Grandfather    • Other (dyslipidemia) Other         maternal side   • Cancer Paternal Uncle 48   • No Known Problems Self    • No Known Problems Sister    • No Known Problems Daughter    • No Known Problems Paternal Grandmother    • No Known Probl nasal drainage  Eyes:  Negative for eye discharge and vision loss  Gastrointestinal:  Negative for abdominal pain, diarrhea and vomiting  Genitourinary:  Negative for dysuria and hematuria  Hema/Lymph:  Negative for easy bleeding and easy bruising  Integum bedtime as a nonsedating antihistamine if having runny nose sneezing itchy watery eyes hives or flushing  Start Flonase 2 sprays per nostril once a day. Must be used on a daily basis for optimal effect and can take 3 to 5 days to take full effect      2. dosing and potential side effects.  Teaching was provided via the teach back method

## 2021-04-08 ENCOUNTER — APPOINTMENT (OUTPATIENT)
Dept: GENERAL RADIOLOGY | Age: 46
End: 2021-04-08
Attending: EMERGENCY MEDICINE
Payer: COMMERCIAL

## 2021-04-08 ENCOUNTER — APPOINTMENT (OUTPATIENT)
Dept: ULTRASOUND IMAGING | Age: 46
End: 2021-04-08
Attending: EMERGENCY MEDICINE
Payer: COMMERCIAL

## 2021-04-08 ENCOUNTER — HOSPITAL ENCOUNTER (OUTPATIENT)
Age: 46
Discharge: HOME | End: 2021-04-08
Attending: EMERGENCY MEDICINE
Payer: COMMERCIAL

## 2021-04-08 VITALS
OXYGEN SATURATION: 98 % | HEART RATE: 90 BPM | TEMPERATURE: 98 F | RESPIRATION RATE: 20 BRPM | SYSTOLIC BLOOD PRESSURE: 115 MMHG | DIASTOLIC BLOOD PRESSURE: 66 MMHG

## 2021-04-08 DIAGNOSIS — M25.561 ACUTE PAIN OF RIGHT KNEE: Primary | ICD-10-CM

## 2021-04-08 PROCEDURE — 93971 EXTREMITY STUDY: CPT | Performed by: EMERGENCY MEDICINE

## 2021-04-08 PROCEDURE — 99214 OFFICE O/P EST MOD 30 MIN: CPT

## 2021-04-08 PROCEDURE — 99213 OFFICE O/P EST LOW 20 MIN: CPT

## 2021-04-08 PROCEDURE — 73560 X-RAY EXAM OF KNEE 1 OR 2: CPT | Performed by: EMERGENCY MEDICINE

## 2021-04-08 NOTE — ED PROVIDER NOTES
Patient Seen in: [facility]      History   Patient presents with:  Leg or Foot Injury: Knee injury. - Entered by patient    Stated Complaint: Leg or Foot Injury - Knee injury.     HPI/Subjective:   HPI    The patient is a 79-year-old female wi [04/08/21 1836]   /66   Pulse 90   Resp 20   Temp 97.9 °F (36.6 °C)   Temp src Temporal   SpO2 98 %   O2 Device None (Room air)       Current:/66   Pulse 90   Temp 97.9 °F (36.6 °C) (Temporal)   Resp 20   LMP 03/03/2021 (Approximate)   SpO2 98%

## 2021-04-08 NOTE — ED INITIAL ASSESSMENT (HPI)
Pt presents with right knee pain/swelling x 1 1/2 weeks, no injury. Pt states pain radiates to upper and lower leg.

## 2021-04-19 ENCOUNTER — OFFICE VISIT (OUTPATIENT)
Dept: ORTHOPEDICS CLINIC | Facility: CLINIC | Age: 46
End: 2021-04-19
Payer: COMMERCIAL

## 2021-04-19 VITALS
HEART RATE: 81 BPM | BODY MASS INDEX: 39.37 KG/M2 | DIASTOLIC BLOOD PRESSURE: 71 MMHG | WEIGHT: 275 LBS | RESPIRATION RATE: 16 BRPM | HEIGHT: 70 IN | SYSTOLIC BLOOD PRESSURE: 115 MMHG

## 2021-04-19 DIAGNOSIS — M23.91 INTERNAL DERANGEMENT OF RIGHT KNEE: Primary | ICD-10-CM

## 2021-04-19 PROCEDURE — 20610 DRAIN/INJ JOINT/BURSA W/O US: CPT | Performed by: PHYSICIAN ASSISTANT

## 2021-04-19 PROCEDURE — 99244 OFF/OP CNSLTJ NEW/EST MOD 40: CPT | Performed by: PHYSICIAN ASSISTANT

## 2021-04-19 PROCEDURE — 3008F BODY MASS INDEX DOCD: CPT | Performed by: PHYSICIAN ASSISTANT

## 2021-04-19 PROCEDURE — 3078F DIAST BP <80 MM HG: CPT | Performed by: PHYSICIAN ASSISTANT

## 2021-04-19 PROCEDURE — 3074F SYST BP LT 130 MM HG: CPT | Performed by: PHYSICIAN ASSISTANT

## 2021-04-19 RX ORDER — TRIAMCINOLONE ACETONIDE 40 MG/ML
40 INJECTION, SUSPENSION INTRA-ARTICULAR; INTRAMUSCULAR ONCE
Status: COMPLETED | OUTPATIENT
Start: 2021-04-19 | End: 2021-04-19

## 2021-04-19 RX ORDER — ALPRAZOLAM 0.5 MG/1
TABLET ORAL
COMMUNITY
Start: 2021-03-13

## 2021-04-19 RX ORDER — OXYMETAZOLINE HYDROCHLORIDE 1 G/100G
CREAM TOPICAL
COMMUNITY
Start: 2020-12-01

## 2021-04-19 RX ADMIN — TRIAMCINOLONE ACETONIDE 40 MG: 40 INJECTION, SUSPENSION INTRA-ARTICULAR; INTRAMUSCULAR at 16:57:00

## 2021-04-19 NOTE — H&P
NURSING INTAKE COMMENTS: Patient presents with:  Consult: right knee pain and swelling x 3 weeks ,patient may have injured walking up a steep path ,      HPI: This 39year old female presents today with complaints of right knee pain for 3 weeks.   It starte Allergies  Family History   Problem Relation Age of Onset   • High Cholesterol Mother 61   • Hypertension Father         borderline   • No Known Problems Brother    • Dementia Maternal Grandmother    • Prostate Cancer Paternal Grandfather    • Other (dysli no depression or anxiety  HEMATOLOGIC: no hx of blood dyscrasia, no Hx DVT/PE  ENDOCRINE: no thyroid or diabetes issues  ALL/ASTHMA: no new hx of severe allergy or asthma    Physical Examination:    Ht 5' 10\" (1.778 m)   Wt 275 lb (124.7 kg)   LMP 03/03/2 (CPT=93971)  COMPARISON: None available. INDICATIONS: Lower leg pain/swelling, predominantly around the right knee. TECHNIQUE: Color duplex Doppler venous ultrasound of the right lower extremity was performed in the usual manner.   FINDINGS: The femoral a results. Follow Up: No follow-ups on file.     Patient seen and evaluated initially by Regi Fernandes PA-C and then with Alejandro Mccain M.D.

## 2021-04-19 NOTE — PROGRESS NOTES
Per verbal order from Dr. Darrian Soria, draw up 5ml of 0.5% Marcaine and 1ml of Kenalog 40 for cortisone injection to right knee. Nory Martinez  Patient provided education handout for cortisone injection.    Patient left office prior to obtaining post injection vit

## 2021-04-20 ENCOUNTER — TELEPHONE (OUTPATIENT)
Dept: ORTHOPEDICS CLINIC | Facility: CLINIC | Age: 46
End: 2021-04-20

## 2021-04-20 NOTE — TELEPHONE ENCOUNTER
Per pt had cortisone injection yesterday and is scheduled for her 2nd covid injection tomorrow. Pt was told by the pharmacist that she cannot get her 2nd injection because it can make her glucose shoot up. Pt is upset and is asking to speak to rn.  Please a

## 2021-04-21 ENCOUNTER — TELEPHONE (OUTPATIENT)
Dept: ALLERGY | Facility: CLINIC | Age: 46
End: 2021-04-21

## 2021-04-21 NOTE — TELEPHONE ENCOUNTER
Patient's call transferred from the phone room. Patient last seen in Allergy 4/3/2021 for .  . .     Allergic rhinitis, unspecified seasonality, unspecified trigger  (primary encounter diagnosis)  Food allergy  Alcohol intolerance    TRIAGE    - Assessme

## 2021-04-21 NOTE — TELEPHONE ENCOUNTER
Patient called saying she's having a severe reaction she's not sure if its something she ate.  She's having some redness on her face, neck and chest. Transferred to nurse at time of call

## 2021-04-21 NOTE — TELEPHONE ENCOUNTER
Call reviewed and noted. Steroids can cause facial flushing and redness as a side effect. May increase Xyzal up to twice a day at this time. May add Benadryl every 4-6 hours if refractory to Xyzal.  Cool compresses to the face.   Patient may consider fol

## 2021-04-21 NOTE — TELEPHONE ENCOUNTER
Patient contacted via telephone and given advise/recommendations as per Dr. Justyna Jj below . . .    Call reviewed and noted. Steroids can cause facial flushing and redness as a side effect. May increase Xyzal up to twice a day at this time.   May add [facility]

## 2021-04-22 NOTE — TELEPHONE ENCOUNTER
I spoke to her by telephone. We discussed her concerns. She suggested we ask patients if they are getting Covid shots if cortisone injections are recommended. I said we would take this as a good suggestion.

## 2021-04-29 ENCOUNTER — PATIENT MESSAGE (OUTPATIENT)
Dept: ALLERGY | Facility: CLINIC | Age: 46
End: 2021-04-29

## 2021-04-29 DIAGNOSIS — J45.40 MODERATE PERSISTENT EXTRINSIC ASTHMA WITHOUT COMPLICATION: Primary | ICD-10-CM

## 2021-04-29 NOTE — TELEPHONE ENCOUNTER
Patient last seen in Allergy 4/3/2021 for .  . .    Allergic rhinitis, unspecified seasonality, unspecified trigger  (primary encounter diagnosis)  Food allergy  Alcohol intolerance     Skin testing today to common indoor and outdoor environmental allergens hour period to present to ER/Urgent Care. Patient states that she would like to know if Dr. Guallpa Certain recommend she have a PFT.

## 2021-04-29 NOTE — TELEPHONE ENCOUNTER
From: Yara Sam  To: Merari Marques MD  Sent: 4/29/2021 10:33 AM CDT  Subject: Visit Jenniferilla Lokesh Gorman,  I met with you a few weeks ago for some allergy testing.  During my appointment, we discussed possible asthma or pulmonary f

## 2021-04-29 NOTE — TELEPHONE ENCOUNTER
Patient contacted and informed of Dr. Etta Penaloza advice recommendations as below . Gustavo Lay Patient given Central Scheduling Telephone number and asked to call to schedule PFT. Patient verbalized understanding of information given. PFT ordered.   Yary Butcher

## 2021-04-29 NOTE — TELEPHONE ENCOUNTER
PFT ordered. Follow-up after PFT or sooner if needed if still symptomatic. Continue with albuterol 2 puffs every 4-6 hours as needed.   If worsening after hours then recommend ED urgent care evaluation

## 2021-05-04 ENCOUNTER — LAB ENCOUNTER (OUTPATIENT)
Dept: LAB | Facility: HOSPITAL | Age: 46
End: 2021-05-04
Attending: ALLERGY & IMMUNOLOGY
Payer: COMMERCIAL

## 2021-05-04 DIAGNOSIS — J45.40 MODERATE PERSISTENT EXTRINSIC ASTHMA WITHOUT COMPLICATION: ICD-10-CM

## 2021-05-07 ENCOUNTER — HOSPITAL ENCOUNTER (OUTPATIENT)
Dept: RESPIRATORY THERAPY | Facility: HOSPITAL | Age: 46
Discharge: HOME | End: 2021-05-07
Attending: ALLERGY & IMMUNOLOGY
Payer: COMMERCIAL

## 2021-05-07 DIAGNOSIS — J45.40 MODERATE PERSISTENT EXTRINSIC ASTHMA WITHOUT COMPLICATION: ICD-10-CM

## 2021-05-07 PROCEDURE — 94726 PLETHYSMOGRAPHY LUNG VOLUMES: CPT | Performed by: INTERNAL MEDICINE

## 2021-05-07 PROCEDURE — 94729 DIFFUSING CAPACITY: CPT | Performed by: INTERNAL MEDICINE

## 2021-05-07 PROCEDURE — 94060 EVALUATION OF WHEEZING: CPT | Performed by: INTERNAL MEDICINE

## 2021-05-11 ENCOUNTER — PATIENT MESSAGE (OUTPATIENT)
Dept: ALLERGY | Facility: CLINIC | Age: 46
End: 2021-05-11

## 2021-05-11 ENCOUNTER — TELEPHONE (OUTPATIENT)
Dept: ALLERGY | Facility: CLINIC | Age: 46
End: 2021-05-11

## 2021-05-11 NOTE — TELEPHONE ENCOUNTER
----- Message from Erika Morales MD sent at 5/11/2021  2:50 PM CDT -----  Please call patient with normal PFT testing per report

## 2021-05-11 NOTE — PROCEDURES
[facility] 1975 MRN [id number]   Height  79 inh  Age 39year old   Weight  275 lbs  Sex Female         Spirometry:   FEV1 3.06 L which is 86%  FEV1/FVC ratio 76% which is normal    No

## 2021-05-11 NOTE — TELEPHONE ENCOUNTER
Spoke with patient. Verified name and . Informed patient of PFT results normal per Dr. Abel Hawthorne. Patient verbalizes understanding, no further questions at this time.

## 2021-05-11 NOTE — ADDENDUM NOTE
Encounter addended by: Ld Reyna MD on: 5/11/2021 2:27 PM   Actions taken: Clinical Note Signed, Charge Capture section accepted

## 2022-02-28 LAB — AMB EXT HPV: NEGATIVE

## 2022-09-09 ENCOUNTER — PATIENT MESSAGE (OUTPATIENT)
Dept: ALLERGY | Facility: CLINIC | Age: 47
End: 2022-09-09

## 2022-09-09 NOTE — TELEPHONE ENCOUNTER
From: Fernando Redman  To: Maira Ricardo MD  Sent: 9/9/2022 9:07 AM CDT  Subject: Questions    Emmy,    I saw Dr. Cheryle Ripple last May I believe it was. I cannot seem to track down my allergy test results. Would you be able to send that to me? I was also wondering if there is a food intolerance test? Blood work maybe? Thank you.     Javid Bustillo
no

## 2022-12-12 ENCOUNTER — LAB ENCOUNTER (OUTPATIENT)
Dept: LAB | Age: 47
End: 2022-12-12
Attending: OBSTETRICS & GYNECOLOGY
Payer: COMMERCIAL

## 2022-12-12 ENCOUNTER — OFFICE VISIT (OUTPATIENT)
Dept: SURGERY | Facility: CLINIC | Age: 47
End: 2022-12-12
Payer: COMMERCIAL

## 2022-12-12 VITALS
HEIGHT: 70 IN | DIASTOLIC BLOOD PRESSURE: 78 MMHG | WEIGHT: 254 LBS | SYSTOLIC BLOOD PRESSURE: 120 MMHG | BODY MASS INDEX: 36.36 KG/M2

## 2022-12-12 DIAGNOSIS — R21 RASH OF FACE: ICD-10-CM

## 2022-12-12 DIAGNOSIS — E34.9 HORMONE DISTURBANCE: ICD-10-CM

## 2022-12-12 DIAGNOSIS — Z12.31 BREAST CANCER SCREENING BY MAMMOGRAM: ICD-10-CM

## 2022-12-12 DIAGNOSIS — E34.9 HORMONE DISTURBANCE: Primary | ICD-10-CM

## 2022-12-12 DIAGNOSIS — N92.0 MENORRHAGIA WITH REGULAR CYCLE: ICD-10-CM

## 2022-12-12 DIAGNOSIS — R45.86 MOOD SWINGS: ICD-10-CM

## 2022-12-12 PROBLEM — Z86.711 HISTORY OF PULMONARY EMBOLUS (PE): Status: ACTIVE | Noted: 2022-12-12

## 2022-12-12 LAB
ESTRADIOL SERPL-MCNC: 71.2 PG/ML
FSH SERPL-ACNC: 3.6 MIU/ML
LH SERPL-ACNC: 6 MIU/ML
PROGEST SERPL-MCNC: 10.8 NG/ML
TSI SER-ACNC: 1.51 MIU/ML (ref 0.36–3.74)

## 2022-12-12 PROCEDURE — 84403 ASSAY OF TOTAL TESTOSTERONE: CPT

## 2022-12-12 PROCEDURE — 84402 ASSAY OF FREE TESTOSTERONE: CPT

## 2022-12-12 PROCEDURE — 36415 COLL VENOUS BLD VENIPUNCTURE: CPT

## 2022-12-12 PROCEDURE — 82670 ASSAY OF TOTAL ESTRADIOL: CPT

## 2022-12-12 PROCEDURE — 84144 ASSAY OF PROGESTERONE: CPT

## 2022-12-12 PROCEDURE — 84443 ASSAY THYROID STIM HORMONE: CPT

## 2022-12-12 PROCEDURE — 83001 ASSAY OF GONADOTROPIN (FSH): CPT

## 2022-12-12 PROCEDURE — 83002 ASSAY OF GONADOTROPIN (LH): CPT

## 2022-12-12 RX ORDER — LAMOTRIGINE 200 MG/1
200 TABLET ORAL 2 TIMES DAILY
COMMUNITY
Start: 2022-11-22

## 2022-12-12 RX ORDER — LAMOTRIGINE 25 MG/1
50 TABLET ORAL 2 TIMES DAILY
COMMUNITY
Start: 2022-11-15

## 2022-12-18 LAB
TESTOSTERONE, FREE, S: 0.33 NG/DL
TESTOSTERONE, TOTAL, S: 24 NG/DL

## 2023-01-09 ENCOUNTER — OFFICE VISIT (OUTPATIENT)
Dept: RHEUMATOLOGY | Facility: CLINIC | Age: 48
End: 2023-01-09
Payer: COMMERCIAL

## 2023-01-09 VITALS
DIASTOLIC BLOOD PRESSURE: 79 MMHG | WEIGHT: 257 LBS | HEIGHT: 70 IN | HEART RATE: 98 BPM | RESPIRATION RATE: 16 BRPM | SYSTOLIC BLOOD PRESSURE: 120 MMHG | BODY MASS INDEX: 36.79 KG/M2

## 2023-01-09 DIAGNOSIS — M25.50 POLYARTHRALGIA: ICD-10-CM

## 2023-01-09 DIAGNOSIS — Z86.2 HISTORY OF BLOOD CLOTTING DISORDER: ICD-10-CM

## 2023-01-09 DIAGNOSIS — G89.29 CHRONIC MIDLINE LOW BACK PAIN WITHOUT SCIATICA: ICD-10-CM

## 2023-01-09 DIAGNOSIS — M79.10 MYALGIA: ICD-10-CM

## 2023-01-09 DIAGNOSIS — R21 RASH OF FACE: Primary | ICD-10-CM

## 2023-01-09 DIAGNOSIS — M54.50 CHRONIC MIDLINE LOW BACK PAIN WITHOUT SCIATICA: ICD-10-CM

## 2023-01-09 DIAGNOSIS — E55.9 VITAMIN D DEFICIENCY: ICD-10-CM

## 2023-01-09 PROCEDURE — 3074F SYST BP LT 130 MM HG: CPT | Performed by: INTERNAL MEDICINE

## 2023-01-09 PROCEDURE — 3078F DIAST BP <80 MM HG: CPT | Performed by: INTERNAL MEDICINE

## 2023-01-09 PROCEDURE — 99204 OFFICE O/P NEW MOD 45 MIN: CPT | Performed by: INTERNAL MEDICINE

## 2023-01-09 PROCEDURE — 3008F BODY MASS INDEX DOCD: CPT | Performed by: INTERNAL MEDICINE

## 2023-01-09 RX ORDER — ARIPIPRAZOLE 5 MG/1
5 TABLET ORAL DAILY
COMMUNITY

## 2023-01-09 RX ORDER — MELATONIN
1000 DAILY
COMMUNITY

## 2023-01-12 LAB
ALBUMIN/GLOBULIN RATIO: 1.7 (CALC) (ref 1–2.5)
ALBUMIN: 4.3 G/DL (ref 3.6–5.1)
ALDOLASE: 4.2 U/L
ALKALINE PHOSPHATASE: 69 U/L (ref 31–125)
ALT: 11 U/L (ref 6–29)
ANA SCREEN, IFA: NEGATIVE
AST: 17 U/L (ref 10–35)
B2 GLYCOPROTEIN I (IGA)AB: <2 U/ML
B2 GLYCOPROTEIN I (IGG)AB: <2 U/ML
B2 GLYCOPROTEIN I(IGM)AB: <2 U/ML
BILIRUBIN, TOTAL: 0.3 MG/DL (ref 0.2–1.2)
BUN: 12 MG/DL (ref 7–25)
C-REACTIVE PROTEIN: 1.9 MG/L
CALCIUM: 9.5 MG/DL (ref 8.6–10.2)
CARBON DIOXIDE: 27 MMOL/L (ref 20–32)
CARDIOLIPIN AB (IGA): 2.3 APL-U/ML
CARDIOLIPIN AB (IGG): <2 GPL-U/ML
CARDIOLIPIN AB (IGM): <2 MPL-U/ML
CHLORIDE: 102 MMOL/L (ref 98–110)
CREATINE KINASE, TOTAL: 71 U/L (ref 29–143)
CREATININE: 0.96 MG/DL (ref 0.5–0.99)
CYCLIC CITRULLINATED$PEPTIDE (CCP) AB (IGG): <16 UNITS
DNA (DS) ANTIBODY: <1 IU/ML
DRVVT SCREEN: 44 SEC
EGFR: 74 ML/MIN/1.73M2
GLOBULIN: 2.6 G/DL (CALC) (ref 1.9–3.7)
GLUCOSE: 98 MG/DL (ref 65–139)
HEMATOCRIT: 40.3 % (ref 35–45)
HEMOGLOBIN: 12.6 G/DL (ref 11.7–15.5)
HLA-B27 ANTIGEN: POSITIVE
MCH: 24.7 PG (ref 27–33)
MCHC: 31.3 G/DL (ref 32–36)
MCV: 78.9 FL (ref 80–100)
MPV: 10.1 FL (ref 7.5–12.5)
PLATELET COUNT: 275 THOUSAND/UL (ref 140–400)
POTASSIUM: 4.1 MMOL/L (ref 3.5–5.3)
PROTEIN, TOTAL: 6.9 G/DL (ref 6.1–8.1)
PTT-LA SCREEN: 38 SEC
RDW: 13.3 % (ref 11–15)
RED BLOOD CELL COUNT: 5.11 MILLION/UL (ref 3.8–5.1)
RHEUMATOID FACTOR: <14 IU/ML
SED RATE BY MODIFIED$WESTERGREN: 17 MM/H
SODIUM: 137 MMOL/L (ref 135–146)
VITAMIN D, 25-OH, TOTAL: 56 NG/ML (ref 30–100)
WHITE BLOOD CELL COUNT: 6.3 THOUSAND/UL (ref 3.8–10.8)

## 2023-01-18 ENCOUNTER — TELEMEDICINE (OUTPATIENT)
Dept: RHEUMATOLOGY | Facility: CLINIC | Age: 48
End: 2023-01-18

## 2023-01-18 DIAGNOSIS — G89.29 CHRONIC MIDLINE THORACIC BACK PAIN: Primary | ICD-10-CM

## 2023-01-18 DIAGNOSIS — R07.81 RIB PAIN: ICD-10-CM

## 2023-01-18 DIAGNOSIS — Z15.89 HLA B27 (HLA B27 POSITIVE): ICD-10-CM

## 2023-01-18 DIAGNOSIS — M54.6 CHRONIC MIDLINE THORACIC BACK PAIN: Primary | ICD-10-CM

## 2023-01-18 PROCEDURE — 99214 OFFICE O/P EST MOD 30 MIN: CPT | Performed by: INTERNAL MEDICINE

## 2023-01-18 RX ORDER — MELOXICAM 7.5 MG/1
7.5 TABLET ORAL DAILY
Qty: 30 TABLET | Refills: 1 | Status: SHIPPED | OUTPATIENT
Start: 2023-01-18

## 2023-01-18 NOTE — PATIENT INSTRUCTIONS
1. Try meloxicam 7.5mg ad ay - take with food   2. Try physical therapy [phone]  3. Consider gabapnetin or pregabalin if the pain isn't better   4. Info on spondylitis   5. Return to clinic in 2 -3months.

## 2023-01-27 ENCOUNTER — OFFICE VISIT (OUTPATIENT)
Facility: CLINIC | Age: 48
End: 2023-01-27
Payer: COMMERCIAL

## 2023-01-27 VITALS
HEIGHT: 70 IN | DIASTOLIC BLOOD PRESSURE: 78 MMHG | OXYGEN SATURATION: 99 % | HEART RATE: 72 BPM | BODY MASS INDEX: 36.79 KG/M2 | WEIGHT: 257 LBS | SYSTOLIC BLOOD PRESSURE: 124 MMHG

## 2023-01-27 DIAGNOSIS — K21.9 GASTROESOPHAGEAL REFLUX DISEASE WITHOUT ESOPHAGITIS: ICD-10-CM

## 2023-01-27 DIAGNOSIS — Z00.00 ENCOUNTER FOR ROUTINE ADULT HEALTH EXAMINATION WITHOUT ABNORMAL FINDINGS: Primary | ICD-10-CM

## 2023-01-27 DIAGNOSIS — Z12.11 COLON CANCER SCREENING: ICD-10-CM

## 2023-01-27 DIAGNOSIS — R14.0 ABDOMINAL BLOATING: ICD-10-CM

## 2023-01-27 DIAGNOSIS — R21 RASH OF FACE: ICD-10-CM

## 2023-01-27 PROBLEM — R06.83 SNORING: Status: ACTIVE | Noted: 2023-01-27

## 2023-01-27 PROBLEM — F41.9 ANXIETY: Status: ACTIVE | Noted: 2017-09-28

## 2023-01-27 PROBLEM — F32.A DEPRESSIVE DISORDER: Status: ACTIVE | Noted: 2017-09-28

## 2023-01-27 RX ORDER — ARIPIPRAZOLE 2 MG/1
2 TABLET ORAL EVERY MORNING
COMMUNITY
Start: 2023-01-17

## 2023-02-10 ENCOUNTER — PATIENT MESSAGE (OUTPATIENT)
Facility: CLINIC | Age: 48
End: 2023-02-10

## 2023-02-10 NOTE — TELEPHONE ENCOUNTER
Per office visit 1/27/23:    Food sensitivity testing completed today given recurrent facial rash without any known allergies and not responsive to dermatology recommendations. Lupus work-up also negative. Will also check food sensitivity panel due to abdominal bloating and difficulties with losing weight. Continue PPI and schedule with new GI provider regarding severe GERD. Cautioned on Meloxicam use long-term given risk of peptic ulcer disease and also to schedule screening colonoscopy. Please advise if results have been received.

## 2023-02-14 ENCOUNTER — PATIENT MESSAGE (OUTPATIENT)
Facility: CLINIC | Age: 48
End: 2023-02-14

## 2023-02-16 ENCOUNTER — TELEPHONE (OUTPATIENT)
Facility: CLINIC | Age: 48
End: 2023-02-16

## 2023-02-16 NOTE — TELEPHONE ENCOUNTER
The patient called and stated she was returning a call from Vi MONAE about her test results from yesterday.

## 2023-02-21 ENCOUNTER — PATIENT MESSAGE (OUTPATIENT)
Facility: CLINIC | Age: 48
End: 2023-02-21

## 2023-02-21 DIAGNOSIS — Z00.00 ROUTINE GENERAL MEDICAL EXAMINATION AT A HEALTH CARE FACILITY: Primary | ICD-10-CM

## 2023-02-21 RX ORDER — ESOMEPRAZOLE MAGNESIUM 40 MG/1
40 CAPSULE, DELAYED RELEASE ORAL AS DIRECTED
Qty: 90 CAPSULE | Refills: 1 | Status: SHIPPED | OUTPATIENT
Start: 2023-02-21 | End: 2023-02-21

## 2023-02-21 RX ORDER — ESOMEPRAZOLE MAGNESIUM 40 MG/1
40 CAPSULE, DELAYED RELEASE ORAL
Qty: 90 CAPSULE | Refills: 1 | Status: SHIPPED | OUTPATIENT
Start: 2023-02-21

## 2023-02-21 NOTE — TELEPHONE ENCOUNTER
Esomeprazole listed as historical. Pended for review.        Patient also states she was told to let you know when she was going to endo as you may want to order lab work:     Future Appointments   Date Time Provider Jc Lilo   2/27/2023  9:00 AM Sandra Mann MD [facility]   4/7/2023 10:00 AM Mykel Benton MD [address] [place] [address]   5/26/2023 11:40 AM [facility]

## 2023-02-27 ENCOUNTER — OFFICE VISIT (OUTPATIENT)
Dept: ENDOCRINOLOGY CLINIC | Facility: CLINIC | Age: 48
End: 2023-02-27

## 2023-02-27 VITALS
HEIGHT: 70 IN | DIASTOLIC BLOOD PRESSURE: 78 MMHG | SYSTOLIC BLOOD PRESSURE: 120 MMHG | WEIGHT: 259.81 LBS | HEART RATE: 93 BPM | BODY MASS INDEX: 37.2 KG/M2

## 2023-02-27 DIAGNOSIS — R23.2 FLUSHING: Primary | ICD-10-CM

## 2023-02-27 DIAGNOSIS — E66.09 CLASS 2 OBESITY DUE TO EXCESS CALORIES WITHOUT SERIOUS COMORBIDITY WITH BODY MASS INDEX (BMI) OF 37.0 TO 37.9 IN ADULT: ICD-10-CM

## 2023-02-27 PROBLEM — E66.812 CLASS 2 OBESITY DUE TO EXCESS CALORIES WITHOUT SERIOUS COMORBIDITY WITH BODY MASS INDEX (BMI) OF 37.0 TO 37.9 IN ADULT: Status: ACTIVE | Noted: 2023-02-27

## 2023-05-15 ENCOUNTER — OFFICE VISIT (OUTPATIENT)
Dept: FAMILY MEDICINE CLINIC | Facility: CLINIC | Age: 48
End: 2023-05-15
Payer: COMMERCIAL

## 2023-05-15 VITALS
TEMPERATURE: 97 F | BODY MASS INDEX: 38.65 KG/M2 | HEIGHT: 70 IN | OXYGEN SATURATION: 97 % | RESPIRATION RATE: 18 BRPM | WEIGHT: 270 LBS | HEART RATE: 93 BPM | DIASTOLIC BLOOD PRESSURE: 84 MMHG | SYSTOLIC BLOOD PRESSURE: 134 MMHG

## 2023-05-15 DIAGNOSIS — J01.90 ACUTE SINUSITIS, RECURRENCE NOT SPECIFIED, UNSPECIFIED LOCATION: Primary | ICD-10-CM

## 2023-05-15 PROCEDURE — 3008F BODY MASS INDEX DOCD: CPT | Performed by: NURSE PRACTITIONER

## 2023-05-15 PROCEDURE — 99213 OFFICE O/P EST LOW 20 MIN: CPT | Performed by: NURSE PRACTITIONER

## 2023-05-15 PROCEDURE — 3075F SYST BP GE 130 - 139MM HG: CPT | Performed by: NURSE PRACTITIONER

## 2023-05-15 PROCEDURE — 3079F DIAST BP 80-89 MM HG: CPT | Performed by: NURSE PRACTITIONER

## 2023-05-15 RX ORDER — AMOXICILLIN 875 MG/1
875 TABLET, COATED ORAL 2 TIMES DAILY
Qty: 14 TABLET | Refills: 0 | Status: SHIPPED | OUTPATIENT
Start: 2023-05-15 | End: 2023-05-22

## 2023-05-26 ENCOUNTER — HOSPITAL ENCOUNTER (OUTPATIENT)
Dept: MAMMOGRAPHY | Facility: HOSPITAL | Age: 48
Discharge: HOME | End: 2023-05-26
Attending: OBSTETRICS & GYNECOLOGY
Payer: COMMERCIAL

## 2023-05-26 DIAGNOSIS — Z12.31 BREAST CANCER SCREENING BY MAMMOGRAM: ICD-10-CM

## 2023-05-26 PROCEDURE — 77067 SCR MAMMO BI INCL CAD: CPT | Performed by: OBSTETRICS & GYNECOLOGY

## 2023-05-26 PROCEDURE — 77063 BREAST TOMOSYNTHESIS BI: CPT | Performed by: OBSTETRICS & GYNECOLOGY

## 2023-06-01 ENCOUNTER — PATIENT MESSAGE (OUTPATIENT)
Facility: CLINIC | Age: 48
End: 2023-06-01

## 2023-06-02 NOTE — TELEPHONE ENCOUNTER
From: Vannessa Louis  To: Miguel Corbin DO  Sent: 6/1/2023 9:34 AM CDT  Subject: Saulo Contreras,  I wanted to get your thoughts on Moujaro and the other weightloss medications out there right now. I have a good 100lbs to lose and have found it extremely difficult to manage with diet and exercise alone. I'm hoping to get some help/feedback. Thank you!   Brittney Orr

## 2023-07-22 ENCOUNTER — APPOINTMENT (OUTPATIENT)
Dept: GENERAL RADIOLOGY | Age: 48
End: 2023-07-22
Attending: EMERGENCY MEDICINE
Payer: COMMERCIAL

## 2023-07-22 ENCOUNTER — HOSPITAL ENCOUNTER (OUTPATIENT)
Age: 48
Discharge: HOME | End: 2023-07-22
Attending: EMERGENCY MEDICINE
Payer: COMMERCIAL

## 2023-07-22 VITALS
OXYGEN SATURATION: 98 % | DIASTOLIC BLOOD PRESSURE: 89 MMHG | RESPIRATION RATE: 18 BRPM | HEART RATE: 99 BPM | TEMPERATURE: 98 F | SYSTOLIC BLOOD PRESSURE: 137 MMHG

## 2023-07-22 DIAGNOSIS — S93.401S SPRAIN OF RIGHT ANKLE, UNSPECIFIED LIGAMENT, SEQUELA: Primary | ICD-10-CM

## 2023-07-22 PROCEDURE — 73610 X-RAY EXAM OF ANKLE: CPT | Performed by: EMERGENCY MEDICINE

## 2023-07-22 PROCEDURE — 99213 OFFICE O/P EST LOW 20 MIN: CPT

## 2023-07-22 PROCEDURE — 99214 OFFICE O/P EST MOD 30 MIN: CPT

## 2023-07-22 NOTE — ED INITIAL ASSESSMENT (HPI)
Presents post fall about 20 min PTA. States she rolled her ankle in her sandals causing her to fall. Hit head on the grill. No LOC. Pain and swelling to right lateral ankle. Painful weight bearing. Soreness to right hand. Superficial abrasions to right elbow. Last TDaP 2016.

## 2023-08-22 RX ORDER — ESOMEPRAZOLE MAGNESIUM 40 MG/1
40 CAPSULE, DELAYED RELEASE ORAL
Qty: 90 CAPSULE | Refills: 3 | Status: SHIPPED | OUTPATIENT
Start: 2023-08-22

## 2023-08-22 NOTE — TELEPHONE ENCOUNTER
Passes protocol but with POP UP HIGH WARNING ALERT due to high dose. High  High Dose: Esomeprazole Magnesium, 40 mg, Oral, Before breakfastSingle dose of 40 mg exceeds recommended maximum of 20 mg by 100%  Daily dose of 40 mg exceeds recommended maximum of 20 mg by 100%        Refill passed per [place] [facility] [facility], [facility] protocol.      Requested Prescriptions   Pending Prescriptions Disp Refills    ESOMEPRAZOLE MAGNESIUM 40 MG Oral Capsule Delayed Release [Pharmacy Med Name: ESOMEPRAZOLE MAGNESIUM DR CAPS 40MG] 90 capsule 3     Sig: TAKE 1 CAPSULE EVERY MORNING BEFORE BREAKFAST       Gastrointestional Medication Protocol Passed - 8/20/2023 11:49 PM        Passed - In person appointment or virtual visit in the past 12 mos or appointment in next 3 mos     Recent Outpatient Visits              3 months ago Acute sinusitis, recurrence not specified, unspecified location    [facility], [address], [place]    Office Visit    5 months ago [place]    [address], [place] [facility]    Office Visit    6 months ago Encounter for routine adult health examination without abnormal findings    [address] [facility], [address] [place], [place]    Office Visit    7 months ago Chronic midline thoracic back pain    [facility], [address], [place] Montgomery MD    Telemedicine    7 months ago Rash of face    [address], [address], Wen Farris MD    Office Visit          Future Appointments         Provider Department Appt Notes    In 4 days Gabe Lala, [address], [address] [place]                     Future Appointments         Provider Department Appt Notes    In 4 days Gabe Lala, [address], [address], [address] [place]             Recent Outpatient Visits 3 months ago Acute sinusitis, recurrence not specified, unspecified location    [facility], [address], [place]    Office Visit    5 months ago [facility] [place]    Office Visit    6 months ago Encounter for routine adult health examination without abnormal findings    Aniceto Bergeron [address]    Office Visit    7 months ago Chronic midline thoracic back pain    [facility], [address], [place] Mcmillan [place]    Telemedicine    7 months ago Rash of face    Starla Brannon, Tessa Sloan MD    Office Visit

## 2023-08-25 ENCOUNTER — LAB ENCOUNTER (OUTPATIENT)
Dept: LAB | Age: 48
End: 2023-08-25
Attending: FAMILY MEDICINE
Payer: COMMERCIAL

## 2023-08-25 ENCOUNTER — OFFICE VISIT (OUTPATIENT)
Facility: CLINIC | Age: 48
End: 2023-08-25
Payer: COMMERCIAL

## 2023-08-25 ENCOUNTER — TELEPHONE (OUTPATIENT)
Facility: CLINIC | Age: 48
End: 2023-08-25

## 2023-08-25 VITALS
WEIGHT: 275 LBS | HEIGHT: 70 IN | HEART RATE: 94 BPM | SYSTOLIC BLOOD PRESSURE: 128 MMHG | DIASTOLIC BLOOD PRESSURE: 76 MMHG | BODY MASS INDEX: 39.37 KG/M2 | OXYGEN SATURATION: 97 %

## 2023-08-25 DIAGNOSIS — E66.09 CLASS 2 OBESITY DUE TO EXCESS CALORIES WITHOUT SERIOUS COMORBIDITY WITH BODY MASS INDEX (BMI) OF 37.0 TO 37.9 IN ADULT: ICD-10-CM

## 2023-08-25 DIAGNOSIS — F41.9 ANXIETY: ICD-10-CM

## 2023-08-25 DIAGNOSIS — F32.A DEPRESSIVE DISORDER: ICD-10-CM

## 2023-08-25 DIAGNOSIS — Z00.00 ROUTINE GENERAL MEDICAL EXAMINATION AT A HEALTH CARE FACILITY: ICD-10-CM

## 2023-08-25 DIAGNOSIS — E66.9 OBESITY (BMI 30-39.9): Primary | ICD-10-CM

## 2023-08-25 DIAGNOSIS — E28.2 PCOS (POLYCYSTIC OVARIAN SYNDROME): ICD-10-CM

## 2023-08-25 DIAGNOSIS — R23.2 FLUSHING: ICD-10-CM

## 2023-08-25 PROBLEM — G56.00 CARPAL TUNNEL SYNDROME: Status: ACTIVE | Noted: 2023-08-25

## 2023-08-25 PROBLEM — M47.812 CERVICAL SPONDYLOSIS WITHOUT MYELOPATHY: Status: ACTIVE | Noted: 2022-04-18

## 2023-08-25 PROBLEM — E66.812 CLASS 2 OBESITY DUE TO EXCESS CALORIES WITHOUT SERIOUS COMORBIDITY WITH BODY MASS INDEX (BMI) OF 37.0 TO 37.9 IN ADULT: Status: RESOLVED | Noted: 2023-02-27 | Resolved: 2023-08-25

## 2023-08-25 PROBLEM — M79.18 CERVICAL MYOFASCIAL PAIN SYNDROME: Status: ACTIVE | Noted: 2022-04-18

## 2023-08-25 LAB
ALBUMIN SERPL-MCNC: 3.6 G/DL (ref 3.4–5)
ALBUMIN/GLOB SERPL: 0.9 {RATIO} (ref 1–2)
ALP LIVER SERPL-CCNC: 75 U/L
ALT SERPL-CCNC: 24 U/L
ANION GAP SERPL CALC-SCNC: 5 MMOL/L (ref 0–18)
AST SERPL-CCNC: 16 U/L (ref 15–37)
BILIRUB SERPL-MCNC: 0.4 MG/DL (ref 0.1–2)
BUN BLD-MCNC: 15 MG/DL (ref 7–18)
BUN/CREAT SERPL: 14 (ref 10–20)
CALCIUM BLD-MCNC: 9.1 MG/DL (ref 8.5–10.1)
CHLORIDE SERPL-SCNC: 106 MMOL/L (ref 98–112)
CHOLEST SERPL-MCNC: 244 MG/DL (ref ?–200)
CO2 SERPL-SCNC: 26 MMOL/L (ref 21–32)
CORTIS SERPL-MCNC: 12.8 UG/DL
CREAT BLD-MCNC: 1.07 MG/DL
EGFRCR SERPLBLD CKD-EPI 2021: 65 ML/MIN/1.73M2 (ref 60–?)
EST. AVERAGE GLUCOSE BLD GHB EST-MCNC: 108 MG/DL (ref 68–126)
FASTING PATIENT LIPID ANSWER: NO
FASTING STATUS PATIENT QL REPORTED: NO
GLOBULIN PLAS-MCNC: 3.9 G/DL (ref 2.8–4.4)
GLUCOSE BLD-MCNC: 81 MG/DL (ref 70–99)
HBA1C MFR BLD: 5.4 % (ref ?–5.7)
HCV AB SERPL QL IA: NONREACTIVE
HDLC SERPL-MCNC: 70 MG/DL (ref 40–59)
INSULIN SERPL-ACNC: 20.5 MU/L (ref 3–25)
LDLC SERPL CALC-MCNC: 146 MG/DL (ref ?–100)
NONHDLC SERPL-MCNC: 174 MG/DL (ref ?–130)
OSMOLALITY SERPL CALC.SUM OF ELEC: 284 MOSM/KG (ref 275–295)
POTASSIUM SERPL-SCNC: 4.3 MMOL/L (ref 3.5–5.1)
PROT SERPL-MCNC: 7.5 G/DL (ref 6.4–8.2)
SODIUM SERPL-SCNC: 137 MMOL/L (ref 136–145)
T4 FREE SERPL-MCNC: 1 NG/DL (ref 0.8–1.7)
TRIGL SERPL-MCNC: 160 MG/DL (ref 30–149)
TSI SER-ACNC: 1.87 MIU/ML (ref 0.36–3.74)
VLDLC SERPL CALC-MCNC: 30 MG/DL (ref 0–30)

## 2023-08-25 PROCEDURE — 3074F SYST BP LT 130 MM HG: CPT | Performed by: FAMILY MEDICINE

## 2023-08-25 PROCEDURE — 83036 HEMOGLOBIN GLYCOSYLATED A1C: CPT

## 2023-08-25 PROCEDURE — 99214 OFFICE O/P EST MOD 30 MIN: CPT | Performed by: FAMILY MEDICINE

## 2023-08-25 PROCEDURE — 83525 ASSAY OF INSULIN: CPT

## 2023-08-25 PROCEDURE — 3078F DIAST BP <80 MM HG: CPT | Performed by: FAMILY MEDICINE

## 2023-08-25 PROCEDURE — 80053 COMPREHEN METABOLIC PANEL: CPT

## 2023-08-25 PROCEDURE — 82533 TOTAL CORTISOL: CPT

## 2023-08-25 PROCEDURE — 3008F BODY MASS INDEX DOCD: CPT | Performed by: FAMILY MEDICINE

## 2023-08-25 PROCEDURE — 36415 COLL VENOUS BLD VENIPUNCTURE: CPT

## 2023-08-25 PROCEDURE — 80061 LIPID PANEL: CPT

## 2023-08-25 PROCEDURE — 86803 HEPATITIS C AB TEST: CPT

## 2023-08-25 PROCEDURE — 84439 ASSAY OF FREE THYROXINE: CPT

## 2023-08-25 PROCEDURE — 84443 ASSAY THYROID STIM HORMONE: CPT

## 2023-08-25 PROCEDURE — 83835 ASSAY OF METANEPHRINES: CPT

## 2023-08-25 PROCEDURE — 84305 ASSAY OF SOMATOMEDIN: CPT

## 2023-08-25 RX ORDER — CLONAZEPAM 0.5 MG/1
0.5 TABLET ORAL DAILY PRN
COMMUNITY
Start: 2023-07-25

## 2023-08-25 NOTE — TELEPHONE ENCOUNTER
Approved    Prior authorization approved Case ID: [id number]      Payer: Jarrett Mae 19    [phone]    [phone]   CQXTZS:[id number];KHXGZT:[id number]; Review Type:Prior Auth; Coverage Start Date:07/26/2023; Coverage End Date:03/22/2024;    Approval Details    Authorized from July 26, 2023 to March 22, 2024      Electronic appeal: Not supported   View History

## 2023-08-27 LAB
IGF I: 176 NG/ML
IGF-1, Z SCORE: 0.9 S.D.

## 2023-08-28 DIAGNOSIS — S93.491A SPRAIN OF ANTERIOR TALOFIBULAR LIGAMENT OF RIGHT ANKLE, INITIAL ENCOUNTER: Primary | ICD-10-CM

## 2023-08-30 ENCOUNTER — PATIENT MESSAGE (OUTPATIENT)
Facility: CLINIC | Age: 48
End: 2023-08-30

## 2023-08-30 DIAGNOSIS — E66.9 OBESITY (BMI 30-39.9): Primary | ICD-10-CM

## 2023-08-30 LAB
METANEPHRINE: 24.3 PG/ML
NORMETANEPHRINE: 70.4 PG/ML

## 2023-09-01 PROBLEM — E66.9 OBESITY (BMI 30-39.9): Status: ACTIVE | Noted: 2023-09-01

## 2023-09-01 RX ORDER — PEN NEEDLE, DIABETIC 30 GX3/16"
1 NEEDLE, DISPOSABLE MISCELLANEOUS DAILY
Qty: 90 EACH | Refills: 0 | Status: SHIPPED | OUTPATIENT
Start: 2023-09-01 | End: 2023-11-30

## 2023-09-01 RX ORDER — LIRAGLUTIDE 6 MG/ML
INJECTION, SOLUTION SUBCUTANEOUS
Qty: 15 ML | Refills: 0 | Status: SHIPPED | OUTPATIENT
Start: 2023-09-01 | End: 2023-10-29

## 2023-09-04 ENCOUNTER — TELEPHONE (OUTPATIENT)
Facility: CLINIC | Age: 48
End: 2023-09-04

## 2023-09-07 ENCOUNTER — PATIENT MESSAGE (OUTPATIENT)
Facility: CLINIC | Age: 48
End: 2023-09-07

## 2023-09-07 DIAGNOSIS — E66.9 OBESITY (BMI 30-39.9): ICD-10-CM

## 2023-09-07 RX ORDER — PEN NEEDLE, DIABETIC 30 GX3/16"
1 NEEDLE, DISPOSABLE MISCELLANEOUS DAILY
Qty: 90 EACH | Refills: 0 | Status: SHIPPED | OUTPATIENT
Start: 2023-09-07 | End: 2023-12-06

## 2023-09-07 RX ORDER — LIRAGLUTIDE 6 MG/ML
INJECTION, SOLUTION SUBCUTANEOUS
Qty: 15 ML | Refills: 0 | Status: SHIPPED | OUTPATIENT
Start: 2023-09-07 | End: 2023-11-03

## 2023-09-08 NOTE — TELEPHONE ENCOUNTER
Dr. Stephanie Contreras, please see patient's MyChart message below, medication pended for review. Thank you. Requested Prescriptions     Pending Prescriptions Disp Refills    Liraglutide -Weight Management (SAXENDA) 18 MG/3ML Subcutaneous Solution Pen-injector 15 mL 0     Sig: Inject 0.6 mg into the skin daily for 7 days, THEN 1.2 mg daily for 7 days, THEN 1.8 mg daily for 7 days, THEN 2.4 mg daily for 7 days, THEN 3 mg daily. Insulin Pen Needle (PEN NEEDLES) 32G X 4 MM Does not apply Misc 90 each 0     Si each daily.        Last Office Visit with PCP: 2023

## 2023-09-11 NOTE — TELEPHONE ENCOUNTER
Can you please call to find out if [facility] will be back in stock soon at the lowest dosages?

## 2023-10-10 ENCOUNTER — PATIENT MESSAGE (OUTPATIENT)
Facility: CLINIC | Age: 48
End: 2023-10-10

## 2023-10-10 DIAGNOSIS — E66.9 OBESITY (BMI 30-39.9): Primary | ICD-10-CM

## 2023-10-12 NOTE — TELEPHONE ENCOUNTER
From: Dexter Lr  To: Antwan Izabella  Sent: 10/10/2023 4:43 PM CDT  Subject: Maribeth Wilkerson,  I was able to get the [address] prescription filled about 4 weeks ago. I will be at 3.0 starting this Thursday. When we're you thinking of trying to switch me to [facility]? I'm hoping it will be easier to get the higher doses of that. Thank you.    Eulogio Leach

## 2023-10-13 NOTE — TELEPHONE ENCOUNTER
semaglutide-weight management 2.4 MG/0.75ML Subcutaneous Solution Auto-injector 9 mL 0 10/12/2023     Sig - Route: Inject 0.75 mL (2.4 mg total) into the skin once a week. - Subcutaneous    Sent to pharmacy as: Semaglutide-Weight Management 2.4 MG/0.75ML Subcutaneous Solution Auto-injector (FNJROX)    E-Prescribing Status: Receipt confirmed by pharmacy (10/12/2023 12:32 PM CDT)    [id number] - [place], [place] - [address], [phone], [phone]     bitFlyer message sent to patient in response to bitFlyer message received--->see message.

## 2023-10-18 ENCOUNTER — NURSE TRIAGE (OUTPATIENT)
Facility: CLINIC | Age: 48
End: 2023-10-18

## 2023-10-18 NOTE — TELEPHONE ENCOUNTER
After checking the chart I already sent [facility] MIKAEL NELSON and she was to start this instead of Saxenda.

## 2023-10-18 NOTE — TELEPHONE ENCOUNTER
Patient notified of provider's recommendation. Patient verbalized understanding. She will start [place] and stop Saxenda. Patient Will still go to [facility].

## 2023-10-18 NOTE — TELEPHONE ENCOUNTER
Diarrhea is not a common side effect with Saxenda so would first see what they say at the [address] [place] [address] after assessing her. If thought due to [address] I would at least lower the dose or we could switch to [facility]VANDANA NELSON 2.4 mg weekly as that was the plan once she was on the higher dose of Saxenda.

## 2023-10-18 NOTE — TELEPHONE ENCOUNTER
Action Requested: Summary for Provider     []  Critical Lab, Recommendations Needed  [x] Need Additional Advice  []   FYI    []   Need Orders  [] Need Medications Sent to Pharmacy  []  Other     SUMMARY: Dr Stover Media: patient advise Immediate care for further evaluation. She wants to know if she should stop saxenda or still take dosing? Reason for call: Diarrhea  Onset: 10/9/23. Diarrhea for more than a week. Has burping and nausea. Has constant bouts of diarrhea all day/all night. No vomiting. No fever  No abd pain, no cramping. On saxenda 3mg; didn't take dose yesterday. Took imodium and did nothing. Patient has been drinking fluids/gatorade. Patient needs evaluation. PCP has no available openings. [facility] to her.      Reason for Disposition   SEVERE diarrhea (e.g., 7 or more times / day more than normal) and present > 24 hours (1 day)    Protocols used: Diarrhea-A-OH

## 2023-11-20 ENCOUNTER — LAB ENCOUNTER (OUTPATIENT)
Dept: LAB | Facility: REFERENCE LAB | Age: 48
End: 2023-11-20
Attending: FAMILY MEDICINE
Payer: COMMERCIAL

## 2023-11-20 ENCOUNTER — OFFICE VISIT (OUTPATIENT)
Facility: CLINIC | Age: 48
End: 2023-11-20
Payer: COMMERCIAL

## 2023-11-20 VITALS
SYSTOLIC BLOOD PRESSURE: 124 MMHG | HEART RATE: 83 BPM | DIASTOLIC BLOOD PRESSURE: 78 MMHG | WEIGHT: 282 LBS | OXYGEN SATURATION: 97 % | HEIGHT: 70 IN | BODY MASS INDEX: 40.37 KG/M2

## 2023-11-20 DIAGNOSIS — F31.81 BIPOLAR 2 DISORDER (HCC): ICD-10-CM

## 2023-11-20 DIAGNOSIS — E66.01 SEVERE OBESITY (BMI >= 40) (HCC): Primary | ICD-10-CM

## 2023-11-20 PROBLEM — E66.9 OBESITY (BMI 30-39.9): Status: RESOLVED | Noted: 2023-09-01 | Resolved: 2023-11-20

## 2023-11-20 PROCEDURE — 3074F SYST BP LT 130 MM HG: CPT | Performed by: FAMILY MEDICINE

## 2023-11-20 PROCEDURE — 90471 IMMUNIZATION ADMIN: CPT | Performed by: FAMILY MEDICINE

## 2023-11-20 PROCEDURE — 36415 COLL VENOUS BLD VENIPUNCTURE: CPT

## 2023-11-20 PROCEDURE — 3008F BODY MASS INDEX DOCD: CPT | Performed by: FAMILY MEDICINE

## 2023-11-20 PROCEDURE — 90686 IIV4 VACC NO PRSV 0.5 ML IM: CPT | Performed by: FAMILY MEDICINE

## 2023-11-20 PROCEDURE — 80175 DRUG SCREEN QUAN LAMOTRIGINE: CPT

## 2023-11-20 PROCEDURE — 3078F DIAST BP <80 MM HG: CPT | Performed by: FAMILY MEDICINE

## 2023-11-20 PROCEDURE — 99214 OFFICE O/P EST MOD 30 MIN: CPT | Performed by: FAMILY MEDICINE

## 2023-11-20 RX ORDER — LIRAGLUTIDE 6 MG/ML
INJECTION, SOLUTION SUBCUTANEOUS
Qty: 12 ML | Refills: 0 | Status: SHIPPED | OUTPATIENT
Start: 2023-11-20 | End: 2023-12-11

## 2023-11-20 RX ORDER — SERTRALINE HYDROCHLORIDE 100 MG/1
100 TABLET, FILM COATED ORAL DAILY
COMMUNITY
Start: 2023-09-22

## 2023-11-20 RX ORDER — PEN NEEDLE, DIABETIC 30 GX3/16"
1 NEEDLE, DISPOSABLE MISCELLANEOUS DAILY
Qty: 90 EACH | Refills: 0 | Status: SHIPPED | OUTPATIENT
Start: 2023-11-20 | End: 2024-02-18

## 2023-11-20 RX ORDER — ONDANSETRON 4 MG/1
4 TABLET, FILM COATED ORAL EVERY 8 HOURS PRN
COMMUNITY
Start: 2023-11-20

## 2023-11-21 ENCOUNTER — PATIENT MESSAGE (OUTPATIENT)
Facility: CLINIC | Age: 48
End: 2023-11-21

## 2023-11-21 DIAGNOSIS — E66.01 SEVERE OBESITY (BMI >= 40) (HCC): ICD-10-CM

## 2023-11-22 LAB — LAMOTRIGINE LVL: 5.8 UG/ML

## 2023-11-22 RX ORDER — LIRAGLUTIDE 6 MG/ML
INJECTION, SOLUTION SUBCUTANEOUS
Qty: 12 ML | Refills: 0 | Status: SHIPPED | OUTPATIENT
Start: 2023-11-22 | End: 2023-12-12

## 2023-11-22 NOTE — TELEPHONE ENCOUNTER
From: Chrissy Perez  To: Arya Coello  Sent: 11/21/2023 1:54 PM CST  Subject: Peter Greg Dr. Stella Kimble is on backorder at [facility]. They just filled the needles. Could you send it to [facility], maybe they have it. Thank you.   Lalo Alas

## 2023-11-22 NOTE — TELEPHONE ENCOUNTER
Please review; no protocol  Please see patient message. Requesting Rx to be sent to [facility] since it is on backorder at [facility]. Medication pended for your review and approval.     Requested Prescriptions   Pending Prescriptions Disp Refills    Liraglutide -Weight Management (SAXENDA) 18 MG/3ML Subcutaneous Solution Pen-injector 12 mL 0     Sig: Inject 0.6 mg into the skin daily for 7 days, THEN 1.2 mg daily for 7 days, THEN 1.8 mg daily for 7 days.        There is no refill protocol information for this order

## 2023-11-30 ENCOUNTER — TELEPHONE (OUTPATIENT)
Facility: CLINIC | Age: 48
End: 2023-11-30

## 2023-11-30 NOTE — TELEPHONE ENCOUNTER
[facility] needs clarification on directions and quantity.         Liraglutide -Weight Management (SAXENDA) 18 MG/3ML Subcutaneous Solution Pen-injector

## 2023-12-29 ENCOUNTER — PATIENT MESSAGE (OUTPATIENT)
Facility: CLINIC | Age: 48
End: 2023-12-29

## 2023-12-29 DIAGNOSIS — E66.01 SEVERE OBESITY (BMI >= 40) (HCC): Primary | ICD-10-CM

## 2023-12-31 RX ORDER — TIRZEPATIDE 5 MG/.5ML
5 INJECTION, SOLUTION SUBCUTANEOUS WEEKLY
Qty: 2 ML | Refills: 0 | Status: SHIPPED | OUTPATIENT
Start: 2023-12-31

## 2023-12-31 NOTE — TELEPHONE ENCOUNTER
From: Linda Braden  To: Zonia Delgado  Sent: 12/29/2023 10:14 AM CST  Subject: Saxenda    Hi Dr. Delgado,  I've been on Saxenda again for a few weeks now. It's not helping like it did before. There is no appetite suppression or help with the sugar cravings.   I was wondering if Zepbound is available now? I assume The lower [place] doses are still hard to get.  Linda

## 2024-01-06 NOTE — TELEPHONE ENCOUNTER
Approved    Prior authorization approved Case ID: [id number]      Payer: [facility]    [phone]    [phone]   CaseId:[id number];Status:Approved;Review Type:Prior Auth;Coverage Start Date:12/07/2023;Coverage End Date:09/02/2024;   Approval Details    Authorized from December 7, 2023 to September 2, 2024

## 2024-02-15 DIAGNOSIS — E66.01 SEVERE OBESITY (BMI >= 40) (HCC): ICD-10-CM

## 2024-02-16 RX ORDER — TIRZEPATIDE 5 MG/.5ML
5 INJECTION, SOLUTION SUBCUTANEOUS WEEKLY
Qty: 2 ML | Refills: 0 | OUTPATIENT
Start: 2024-02-16

## 2024-02-26 ENCOUNTER — OFFICE VISIT (OUTPATIENT)
Facility: CLINIC | Age: 49
End: 2024-02-26
Payer: COMMERCIAL

## 2024-02-26 VITALS
DIASTOLIC BLOOD PRESSURE: 76 MMHG | HEIGHT: 70 IN | OXYGEN SATURATION: 98 % | SYSTOLIC BLOOD PRESSURE: 120 MMHG | HEART RATE: 78 BPM | BODY MASS INDEX: 38.22 KG/M2 | WEIGHT: 267 LBS

## 2024-02-26 DIAGNOSIS — E66.9 OBESITY (BMI 30-39.9): Primary | ICD-10-CM

## 2024-02-26 DIAGNOSIS — F32.A DEPRESSIVE DISORDER: ICD-10-CM

## 2024-02-26 DIAGNOSIS — Z12.11 COLON CANCER SCREENING: ICD-10-CM

## 2024-02-26 DIAGNOSIS — Z12.31 VISIT FOR SCREENING MAMMOGRAM: ICD-10-CM

## 2024-02-26 PROCEDURE — 99214 OFFICE O/P EST MOD 30 MIN: CPT | Performed by: FAMILY MEDICINE

## 2024-02-26 RX ORDER — BUPROPION HYDROCHLORIDE 150 MG/1
150 TABLET ORAL EVERY MORNING
COMMUNITY
Start: 2024-02-16

## 2024-02-26 RX ORDER — FERROUS SULFATE 137(45) MG
142 TABLET, EXTENDED RELEASE ORAL DAILY
COMMUNITY

## 2024-02-26 RX ORDER — TIRZEPATIDE 7.5 MG/.5ML
7.5 INJECTION, SOLUTION SUBCUTANEOUS WEEKLY
Qty: 2 ML | Refills: 0 | Status: SHIPPED | OUTPATIENT
Start: 2024-02-26

## 2024-02-26 NOTE — PROGRESS NOTES
CC:    Chief Complaint   Patient presents with    Medication Follow-Up     zepbound       HPI: 48 year old female here to follow-up on Zepbound for weight loss.  Has had some constipation with the Zepbound, but since starting a fiber supplement this has improved.   Does have some nausea, but no vomiting with Zepbound.  She is eating half of what she used to eat, and she no longer wants or craves alcohol.   She is no longer cravings sweets either.   Has been feeling more depressed lately, and her psychiatrist just started her on Wellbutrin for this.     ROS:  General:  No fever, fatigue, decreased appetite   HEENT:  Denies congestion or nasal discharge  Cardio:  No chest pain  Pulmonary:  No cough, no SOB  GI:  No N/V/D, intermittent constipation, no hematochezia   :  No discharge, no dysuria, no polyuria, no hematuria  Dermatologic:  No rashes    Past Medical History:   Diagnosis Date    Allergic rhinitis     Anxiety state, unspecified     Depression     Dysmenorrhea 01/01/2020    Painful Cramping    Esophageal reflux     Gestational diabetes ([facility])     2010, 2013    Hip pain, bilateral     Lower back pain     Obesity     Polycystic ovary syndrome     on meds    Pulmonary embolism ([facility]) 01/16/2015    Sleep apnea        Social History     Socioeconomic History    Marital status:      Spouse name: Not on file    Number of children: Not on file    Years of education: Not on file    Highest education level: Not on file   Occupational History     Employer: [employer]   Tobacco Use    Smoking status: Former     Years: 16     Types: Cigarettes     Quit date: 11/1/2008     Years since quitting: 15.3    Smokeless tobacco: Never    Tobacco comments:     No Household Smokers.    Vaping Use    Vaping Use: Never used   Substance and Sexual Activity    Alcohol use: Yes     Alcohol/week: 7.0 standard drinks of alcohol     Types: 2 Glasses of wine, 5 Standard drinks or equivalent per week     Comment:  [place]    Drug use: No    Sexual activity: Yes     Partners: Male     Birth control/protection: Vasectomy   Other Topics Concern     Service Not Asked    Blood Transfusions Not Asked    Caffeine Concern No    Occupational Exposure Not Asked    Hobby Hazards Not Asked    Sleep Concern Not Asked    Stress Concern Yes    Weight Concern Yes    Special Diet Not Asked    Back Care Not Asked    Exercise Yes    Bike Helmet Not Asked    Seat Belt Yes    Self-Exams Not Asked   Social History Narrative    The patient does not use an assistive device..      The patient does live in a home with stairs. 18 stairs     Social Determinants of Health     Financial Resource Strain: Not on file   Food Insecurity: Not on file   Transportation Needs: Not on file   Physical Activity: Not on file   Stress: Not on file   Social Connections: Not on file   Housing Stability: Not on file       Current Outpatient Medications   Medication Sig Dispense Refill    buPROPion  MG Oral Tablet 24 Hr Take 1 tablet (150 mg total) by mouth every morning.      Tirzepatide-Weight Management (ZEPBOUND) 7.5 MG/0.5ML Subcutaneous Solution Auto-injector Inject 7.5 mg into the skin once a week. 2 mL 0    sertraline 100 MG Oral Tab Take 1 tablet (100 mg total) by mouth daily.      ondansetron (ZOFRAN) 4 mg tablet Take 1 tablet (4 mg total) by mouth every 8 (eight) hours as needed for Nausea.      Esomeprazole Magnesium 40 MG Oral Capsule Delayed Release Take 1 capsule (40 mg total) by mouth before breakfast. 90 capsule 3    lamoTRIgine 200 MG Oral Tab Take 1 tablet (200 mg total) by mouth 2 (two) times daily. 180 tablet 0    ARIPiprazole 2 MG Oral Tab Take 1 tablet (2 mg total) by mouth every morning.      ARIPiprazole 5 MG Oral Tab Take 1 tablet (5 mg total) by mouth daily.      CHOLECALCIFEROL 25 MCG (1000 UT) Oral Tab Take 1 tablet (1,000 Units total) by mouth daily.      CYANOCOBALAMIN 1000 MCG Oral Tab Take 1 tablet (1,000 mcg total) by mouth  daily.      ALPRAZolam 0.5 MG Oral Tab       Probiotic Product (PROBIOTIC OR) Take by mouth.      aspirin 325 MG Oral Tab Take 1 tablet (325 mg total) by mouth daily.         Patient has no known allergies.      Vitals:   Vitals:    02/26/24 0954   BP: 120/76   Pulse: 78   SpO2: 98%   Weight: 267 lb (121.1 kg)   Height: 5' 10\" (1.778 m)     Wt Readings from Last 6 Encounters:   02/26/24 267 lb (121.1 kg)   11/20/23 282 lb (127.9 kg)   08/25/23 275 lb (124.7 kg)   05/15/23 270 lb (122.5 kg)   02/27/23 259 lb 12.8 oz (117.8 kg)   01/27/23 257 lb (116.6 kg)       Body mass index is 38.31 kg/m².    Physical:  General:  Alert, appropriate, no acute distress   Cardio:  RRR, no murmurs, S1, S2  Psych: normal mood and affect    Assessment and Plan: 48 year old female here to follow-up on Zepbound for weight loss.     1. Obesity (BMI 30-39.9)    - Doing very well on Zepbound overall, and has lost 5% of her starting body weight since switching to medication 6 weeks ago  - Will continue at 7.5 mg weekly and work on increasing water and fiber intake to help with constipation  - Notify me on dosage prior to needing next refill   - Tirzepatide-Weight Management (ZEPBOUND) 7.5 MG/0.5ML Subcutaneous Solution Auto-injector; Inject 7.5 mg into the skin once a week.  Dispense: 2 mL; Refill: 0    2. Depressive disorder    - Following up with psychiatrist, and recently added Wellbutrin to help with depression   - Tirzepatide-Weight Management (ZEPBOUND) 7.5 MG/0.5ML Subcutaneous Solution Auto-injector; Inject 7.5 mg into the skin once a week.  Dispense: 2 mL; Refill: 0    3. Visit for screening mammogram    - Due in May   - 3D Mammogram Digital Screen, Bilateral (CPT=77067/58976); Future    4. Colon cancer screening    - Due for screening colonoscopy   - Gastro GI Telephone Colon Screen; Future      Zonia Delgado DO  02/26/24  10:20 AM

## 2024-03-05 ENCOUNTER — PATIENT MESSAGE (OUTPATIENT)
Facility: CLINIC | Age: 49
End: 2024-03-05

## 2024-03-05 DIAGNOSIS — E66.9 OBESITY (BMI 30-39.9): ICD-10-CM

## 2024-03-05 DIAGNOSIS — F32.A DEPRESSIVE DISORDER: ICD-10-CM

## 2024-03-06 RX ORDER — TIRZEPATIDE 7.5 MG/.5ML
7.5 INJECTION, SOLUTION SUBCUTANEOUS WEEKLY
Qty: 6 ML | Refills: 0 | Status: SHIPPED | OUTPATIENT
Start: 2024-03-06

## 2024-03-06 NOTE — TELEPHONE ENCOUNTER
From: Linda Braden  To: Zonia Delgado  Sent: 3/5/2024 5:43 PM CST  Subject: Zepbound    Hi Dr. Delgado,  Insurance requirements have changed, and [facility] can only fill the Zepbound for a 3 month supply. Stinks since I wasn't sure if I was going to want to go up a dose next month.  Would you please send a 3 month prescription to [facility] for thr 7.5?  Thank you!!!  Linda

## 2024-03-06 NOTE — TELEPHONE ENCOUNTER
Please advise on request for 3 month supply of Zepbound due to insurance requirement. New script pended below.

## 2024-03-08 ENCOUNTER — TELEPHONE (OUTPATIENT)
Facility: CLINIC | Age: 49
End: 2024-03-08

## 2024-03-08 DIAGNOSIS — E66.9 OBESITY (BMI 30-39.9): ICD-10-CM

## 2024-03-08 DIAGNOSIS — F32.A DEPRESSIVE DISORDER: ICD-10-CM

## 2024-03-08 RX ORDER — TIRZEPATIDE 7.5 MG/.5ML
7.5 INJECTION, SOLUTION SUBCUTANEOUS WEEKLY
Qty: 6 ML | Refills: 0 | Status: CANCELLED | OUTPATIENT
Start: 2024-03-08

## 2024-03-08 NOTE — TELEPHONE ENCOUNTER
Patient states her insurance is requesting another pre-authoraztion for her prescription.      [facility] pre-authorization #  [id number]        See [employer] for more info if needed.

## 2024-03-08 NOTE — TELEPHONE ENCOUNTER
Called [facility] pre-authorization to start Prior Authorization for Zepbound 7.5mg they states Prior Authorization is not needed but Quantity Case is needed.     Case ID [id number]     Quantity Case approved 2/7/24-3/8/2025    6 pens for 84 days.    [employer] message sent to the patient

## 2024-03-08 NOTE — TELEPHONE ENCOUNTER
Triage Support Team, has this request for PA been addressed for Zepbound?      Linda Braden   to P Em Triage Support (supporting Zonia Delgado DO)         3/8/24 11:49 AM  Thank you.  I do not have any medication left and need to have it by next Thursday when I'm due to take it again.    I was given the number [phone] for the quickest option to get the pre-authorization done.  I really appreciate yor help.  Thank you.  Elenita Mendenhall, RONNIE   to Linda Braden   SG      3/8/24 11:44 AM  Hi Linda,  We have received a message from the pharmacy that this medication required what's called a prior authorization. We have sent this to our team here who help with these. They can take 5-7 business days to process once we initiate with the insurance company. Please allow us this time to work on this and you may call later next week if you have not heard from us or the pharmacy on this. The pharmacy gets updates on these outcomes same time we do so either works.      Thanks for understanding.      Elenita CHANDRA RN     Last read by Linda Braden at  1:36 PM on 3/8/2024.

## 2024-03-08 NOTE — TELEPHONE ENCOUNTER
Patient states her insurance is requesting another pre-authoraztion for her prescription.     [facility] pre-authorization #  [phone]

## 2024-03-10 NOTE — TELEPHONE ENCOUNTER
Prior Authorization History  Tirzepatide-Weight Management (ZEPBOUND) 7.5 MG/0.5ML Subcutaneous Solution Auto-injector     Approval Details    Authorization number: [id number]   Authorized from February 7, 2024 to March 8, 2025   Information entered manually

## 2024-03-28 ENCOUNTER — PATIENT MESSAGE (OUTPATIENT)
Facility: CLINIC | Age: 49
End: 2024-03-28

## 2024-03-28 DIAGNOSIS — E66.9 OBESITY (BMI 30-39.9): Primary | ICD-10-CM

## 2024-03-28 DIAGNOSIS — F41.9 ANXIETY: ICD-10-CM

## 2024-03-29 RX ORDER — TIRZEPATIDE 10 MG/.5ML
10 INJECTION, SOLUTION SUBCUTANEOUS WEEKLY
Qty: 2 ML | Refills: 2 | Status: SHIPPED | OUTPATIENT
Start: 2024-03-29

## 2024-03-29 NOTE — TELEPHONE ENCOUNTER
Sarah Velez, RN 3/29/2024 12:55 PM CDT        ----- Message -----  From: Linda Braden  Sent: 3/28/2024 8:55 PM CDT  To: Em Triage Support  Subject: Zepbound     Hi Dr. Delgado,  Would you please send the next dosage level of Zepbound to [facility]? I believe it is 10mg.  I had such an issue with insurance staying on the 7.5, I figured let's give 10 a try this time.  Thank you!  Linda Braden

## 2024-04-14 ENCOUNTER — OFFICE VISIT (OUTPATIENT)
Dept: FAMILY MEDICINE CLINIC | Facility: CLINIC | Age: 49
End: 2024-04-14
Payer: COMMERCIAL

## 2024-04-14 VITALS
SYSTOLIC BLOOD PRESSURE: 114 MMHG | TEMPERATURE: 98 F | RESPIRATION RATE: 18 BRPM | OXYGEN SATURATION: 96 % | WEIGHT: 251.19 LBS | DIASTOLIC BLOOD PRESSURE: 78 MMHG | HEIGHT: 70 IN | HEART RATE: 89 BPM | BODY MASS INDEX: 35.96 KG/M2

## 2024-04-14 DIAGNOSIS — H10.31 ACUTE CONJUNCTIVITIS OF RIGHT EYE, UNSPECIFIED ACUTE CONJUNCTIVITIS TYPE: Primary | ICD-10-CM

## 2024-04-14 PROCEDURE — 99213 OFFICE O/P EST LOW 20 MIN: CPT | Performed by: NURSE PRACTITIONER

## 2024-04-14 RX ORDER — POLYMYXIN B SULFATE AND TRIMETHOPRIM 1; 10000 MG/ML; [USP'U]/ML
SOLUTION OPHTHALMIC
Qty: 10 ML | Refills: 0 | Status: SHIPPED | OUTPATIENT
Start: 2024-04-14

## 2024-04-14 RX ORDER — ARIPIPRAZOLE 10 MG/1
10 TABLET ORAL EVERY MORNING
COMMUNITY
Start: 2024-03-29

## 2024-04-14 NOTE — PROGRESS NOTES
CHIEF COMPLAINT:     Chief Complaint   Patient presents with    Eye Problem     Day w/ irritation,discharge and eye discomfort. (Right)       HPI:   Linda Brdaen is a 48 year old female who presents with chief complaint of possible pink eye/eye complaint. Symptoms began yesterday.  Symptoms have been worsening since onset.   Patient reports unilateral RIGHT eye redness, +creamy discharge, + eyelid crusting/matting, +perri/gritty sensation to the eye.  Denies eye pain, vision change, periorbital symptoms, photophobia, or foreign body sensation.  +Mild congestion/headache, feels like it may be allergies.  Denies fever, eye trauma, or contact with irritant.  Treatments tried: Lubricating eye drop.  Denies eye itching/recent seasonal allergies.  The patient does not wear contacts.  Uses glasses for computer.    Current Outpatient Medications   Medication Sig Dispense Refill    ARIPiprazole 10 MG Oral Tab Take 1 tablet (10 mg total) by mouth every morning.      polymyxin B-trimethoprim (POLYTRIM) 89953-1.1 UNIT/ML-% Ophthalmic Solution Instill 1 drop into right eye every 3-4 hours for 5-7 days 10 mL 0    Tirzepatide-Weight Management (ZEPBOUND) 10 MG/0.5ML Subcutaneous Solution Auto-injector Inject 10 mg into the skin once a week. 2 mL 2    buPROPion  MG Oral Tablet 24 Hr Take 1 tablet (150 mg total) by mouth every morning.      sertraline 100 MG Oral Tab Take 1 tablet (100 mg total) by mouth daily.      ondansetron (ZOFRAN) 4 mg tablet Take 1 tablet (4 mg total) by mouth every 8 (eight) hours as needed for Nausea.      Esomeprazole Magnesium 40 MG Oral Capsule Delayed Release Take 1 capsule (40 mg total) by mouth before breakfast. 90 capsule 3    lamoTRIgine 200 MG Oral Tab Take 1 tablet (200 mg total) by mouth 2 (two) times daily. 180 tablet 0    CHOLECALCIFEROL 25 MCG (1000 UT) Oral Tab Take 1 tablet (1,000 Units total) by mouth daily.      CYANOCOBALAMIN 1000 MCG Oral Tab Take 1 tablet (1,000 mcg total) by  mouth daily.      ALPRAZolam 0.5 MG Oral Tab       Probiotic Product (PROBIOTIC OR) Take by mouth.      aspirin 325 MG Oral Tab Take 1 tablet (325 mg total) by mouth daily.      Ferrous Sulfate ER (SLOW FE) 142 (45 Fe) MG Oral Tab CR Take 1 tablet (142 mg total) by mouth daily. (Patient not taking: Reported on 2024)      ARIPiprazole 2 MG Oral Tab Take 1 tablet (2 mg total) by mouth every morning. (Patient not taking: Reported on 2024)      ARIPiprazole 5 MG Oral Tab Take 1 tablet (5 mg total) by mouth daily. (Patient not taking: Reported on 2024)        Past Medical History:    Allergic rhinitis    Anxiety state, unspecified    Depression    Dysmenorrhea    Painful Cramping    Esophageal reflux    Gestational diabetes (HCC)    ,     Hip pain, bilateral    Lower back pain    Obesity    Polycystic ovary syndrome    on meds    Pulmonary embolism (HCC)    Sleep apnea      Past Surgical History:   Procedure Laterality Date      8/15/16    Egd  2022      10/7/10 and 13      Family History   Problem Relation Age of Onset    Hypertension Father         borderline    High Cholesterol Mother 60    No Known Problems Daughter     Dementia Maternal Grandmother     No Known Problems Paternal Grandmother     No Known Problems Sister     No Known Problems Brother     No Known Problems Maternal Aunt     No Known Problems Paternal Aunt     No Known Problems Maternal Cousin Female     No Known Problems Maternal Cousin Male     No Known Problems Self     No Known Problems Paternal Cousin Female     No Known Problems Paternal Cousin Male     Other (dyslipidemia) Other         maternal side    Cancer Maternal Uncle         Brain cancer    Cancer Maternal Uncle         Lung cancer snad brain cancer    Breast Cancer Neg     Ovarian Cancer Neg     DCIS Neg     LCIS Neg     BRCA gene + Neg     Ashkenazi Worship Descent Neg       Social History     Socioeconomic History    Marital status:     Occupational History     Employer: [email or web address]   Tobacco Use    Smoking status: Former     Current packs/day: 0.00     Types: Cigarettes     Start date: 11/1/1992     Quit date: 11/1/2008     Years since quitting: 15.4    Smokeless tobacco: Never    Tobacco comments:     No Household Smokers.    Vaping Use    Vaping status: Never Used   Substance and Sexual Activity    Alcohol use: Yes     Alcohol/week: 7.0 standard drinks of alcohol     Types: 2 Glasses of wine, 5 Standard drinks or equivalent per week     Comment: [place]    Drug use: No    Sexual activity: Yes     Partners: Male     Birth control/protection: Vasectomy   Other Topics Concern    Caffeine Concern No    Stress Concern Yes    Weight Concern Yes    Exercise Yes    Seat Belt Yes   Social History Narrative    The patient does not use an assistive device..      The patient does live in a home with stairs. 18 stairs         REVIEW OF SYSTEMS:   GENERAL: feels well otherwise  SKIN: no rashes  EYES:denies blurred vision or double vision. See HPI  HENT: denies ear pain, congestion, sore throat  LUNGS: denies shortness of breath or cough  CARDIOVASCULAR: denies chest pain or palpitations   GI: denies N/V/C or abdominal pain  NEURO: denies headaches     EXAM:   /78   Pulse 89   Temp 98 °F (36.7 °C)   Resp 18   Ht 5' 10\" (1.778 m)   Wt 251 lb 3.2 oz (113.9 kg)   LMP 04/01/2024 (Approximate)   SpO2 96%   BMI 36.04 kg/m²   Physical Exam  Constitutional:       Appearance: Normal appearance. She is not ill-appearing.   HENT:      Head: Normocephalic and atraumatic.      Right Ear: Tympanic membrane, ear canal and external ear normal.      Left Ear: Tympanic membrane, ear canal and external ear normal.      Nose: Nose normal.      Mouth/Throat:      Mouth: Mucous membranes are moist.      Pharynx: Oropharynx is clear. No posterior oropharyngeal erythema.   Eyes:      General: Lids are normal. Vision grossly intact.         Right  eye: No discharge.         Left eye: No discharge.      Extraocular Movements: Extraocular movements intact.      Conjunctiva/sclera:      Right eye: Right conjunctiva is injected (sclera injected).      Left eye: Left conjunctiva is not injected (sclera injected).      Pupils: Pupils are equal, round, and reactive to light.      Comments: No periorbital edema or erythema   Cardiovascular:      Rate and Rhythm: Normal rate and regular rhythm.      Pulses: Normal pulses.      Heart sounds: Normal heart sounds. No murmur heard.  Musculoskeletal:      Cervical back: Normal range of motion and neck supple.   Lymphadenopathy:      Cervical: No cervical adenopathy.   Skin:     General: Skin is warm and dry.      Findings: No rash.   Neurological:      Mental Status: She is alert.      Visual Acuity     Vision Screen Test Type: Snellen Wall Chart    Right Eye Visual Acuity: Uncorrected Right Eye Chart Acuity: 20/25   Left Eye Visual Acuity: Uncorrected Left Eye Chart Acuity: 20/25   Both Eyes Visual Acuity: Uncorrected Both Eyes Chart Acuity: 20/25        ASSESSMENT AND PLAN:   Linda Braden is a 48 year old female who presents with:    ASSESSMENT:   Encounter Diagnosis   Name Primary?    Acute conjunctivitis of right eye, unspecified acute conjunctivitis type Yes       PLAN:   - Medication as listed below.  - Contagiousness/Hygeine and comfort care as listed in patient instructions.    - Advised patient to avoid touching eyes.    - Stressed importance of good handwashing as conjunctivitis is very contagious.   - Advised that pt follows up in office if no improvement/worsening within 2-3 days.    - Advised ER or to optho immediately, though, if new eye pain, vision change, periorbital symptoms, photophobia, or foreign body sensation.  - Pt verbalizes understanding and is agreeable w/ plan.         Requested Prescriptions     Signed Prescriptions Disp Refills    polymyxin B-trimethoprim (POLYTRIM) 69801-1.1 UNIT/ML-%  Ophthalmic Solution 10 mL 0     Sig: Instill 1 drop into right eye every 3-4 hours for 5-7 days         Risks, benefits, complications and side effects of meds discussed.  Pt verbalizes understanding.

## 2024-04-18 ENCOUNTER — PATIENT MESSAGE (OUTPATIENT)
Facility: CLINIC | Age: 49
End: 2024-04-18

## 2024-04-18 DIAGNOSIS — F41.9 ANXIETY: ICD-10-CM

## 2024-04-18 DIAGNOSIS — E66.9 OBESITY (BMI 30-39.9): Primary | ICD-10-CM

## 2024-04-19 NOTE — TELEPHONE ENCOUNTER
Instructed patient to contact her pharmacy for the remaining refills. See below.       MEDICATION RECORD :    Tirzepatide-Weight Management (ZEPBOUND) 10 MG/0.5ML Subcutaneous Solution Auto-injector 2 mL 2 3/29/2024 --     Sig - Route: Inject 10 mg into the skin once a week. - Subcutaneous     Sent to pharmacy as: Zepbound 10 MG/0.5ML Subcutaneous Solution Auto-injector (Tirzepatide-Weight Management)     E-Prescribing Status: Receipt confirmed by pharmacy (3/29/2024  5:58 PM CDT)        Associated Diagnoses     Obesity (BMI 30-39.9)  - Primary      Anxiety         Pharmacy     [id number] - [place], [place] - [address] [facility], [phone], [phone]              From: Linda Braden  To: Zonia Delgado  Sent: 4/18/2024  3:18 PM CDT  Subject: Zepbound    Hi Dr. Delgado,  I need a refill on the Zepbound, but would like to stay at 10mg for the moment.  I am almost positive the insurance needs another preauthorization if I stay on the same dose instead of increasing.  That's what happened when I stayed on a lower dose for 2 months previously.  Would you be able to do that [facility] well [facility]    Thank you!  Linda Braden

## 2024-04-26 RX ORDER — TIRZEPATIDE 12.5 MG/.5ML
12.5 INJECTION, SOLUTION SUBCUTANEOUS WEEKLY
Qty: 6 ML | Refills: 0 | Status: SHIPPED | OUTPATIENT
Start: 2024-04-26

## 2024-05-06 ENCOUNTER — TELEPHONE (OUTPATIENT)
Dept: SURGERY | Facility: CLINIC | Age: 49
End: 2024-05-06

## 2024-05-20 ENCOUNTER — TELEPHONE (OUTPATIENT)
Facility: CLINIC | Age: 49
End: 2024-05-20

## 2024-05-20 DIAGNOSIS — F41.9 ANXIETY: ICD-10-CM

## 2024-05-20 DIAGNOSIS — E66.9 OBESITY (BMI 30-39.9): Primary | ICD-10-CM

## 2024-05-20 NOTE — TELEPHONE ENCOUNTER
Spoke to patient. She has one pen of Zepbound left and will take it on Thursday.     SHANE Black.

## 2024-05-20 NOTE — TELEPHONE ENCOUNTER
Patient states she has contacted multiple pharmacies, and none of them have Zepbound in stock.  Patient states [facility] only has the initial dose available. Patient is already on 12.5mg.  Patient asking for alternative medications.  Please advise.

## 2024-05-20 NOTE — TELEPHONE ENCOUNTER
Wegovy or Saxenda would be the only possible alternatives. Has she run out of Zepbound yet? How much does she have left?

## 2024-05-20 NOTE — TELEPHONE ENCOUNTER
Not sure if she told you a preference on Wegovy versus Saxenda but I sent the Wegovy 1.7 mg weekly as that is roughly equivalent to the Zepbound dose she is taking. She did have nausea with Wegovy in the past so if she is not okay with this temporary switch please let me know.

## 2024-05-20 NOTE — TELEPHONE ENCOUNTER
Patient informed of below information and she will call us back once she finds out what the pharmacy have in stock.

## 2024-05-20 NOTE — TELEPHONE ENCOUNTER
Patient notified with understanding.  She will try the wegovy and let the clinic know her response.

## 2024-05-29 ENCOUNTER — PATIENT MESSAGE (OUTPATIENT)
Facility: CLINIC | Age: 49
End: 2024-05-29

## 2024-05-29 DIAGNOSIS — E66.9 OBESITY (BMI 30-39.9): Primary | ICD-10-CM

## 2024-05-29 DIAGNOSIS — F41.9 ANXIETY: ICD-10-CM

## 2024-05-30 RX ORDER — TIRZEPATIDE 7.5 MG/.5ML
7.5 INJECTION, SOLUTION SUBCUTANEOUS WEEKLY
Qty: 2 ML | Refills: 0 | Status: SHIPPED | OUTPATIENT
Start: 2024-05-30

## 2024-05-30 NOTE — TELEPHONE ENCOUNTER
From: Linda Braden  To: Zonia Delgado  Sent: 5/29/2024 7:37 PM CDT  Subject: Zepbound    Hello,  I am still struggling to find my Zepbound. I had you send a prescription for the 15mg to [facility] because they said it was in stock. Now it's not in stock apparently. I’ve tried calling around for Wegovy and Saxenda as well and no luck.  [facility] said they have Zepbound 7.5mg currently. I'm wondering if I should just go down to that to be able to stay on it. I don't know what insurance will think about that though.  Can you provide some feedback/thoughts on what to do?   Thank you.  Linda Braden

## 2024-05-31 ENCOUNTER — TELEPHONE (OUTPATIENT)
Facility: CLINIC | Age: 49
End: 2024-05-31

## 2024-05-31 NOTE — TELEPHONE ENCOUNTER
Patient calling stating she contacted her insurance and since she is going down a dose of the Zepbound it is requiring a prior authorization. She was advised that our office needed to contact the insurance. Advised patient of the time frame of a prior authorization and that she will be kept updated throughout the process via [employer]. Patient verbalizes understanding and had no additional questions or concerns at this time.    -refer to 5/29/24 [employer] message

## 2024-05-31 NOTE — TELEPHONE ENCOUNTER
Approved   5/31/2024 11:05 AM  Note from payer: An active PA is already on file with expiration date of 03/08/2025. Please wait to resubmit request within 60 days of that expiration date to obtain a PA renewal.   Payer: [employer]    [phone]

## 2024-05-31 NOTE — TELEPHONE ENCOUNTER
Patient calling stating she contacted her insurance and since she is going down a dose of the Zepbound it is requiring a prior authorization. She was advised that our office needed to contact the insurance. Advised patient of the time frame of a prior authorization and that she will be kept updated throughout the process via [employer]. Patient verbalizes understanding and had no additional questions or concerns at this time.    -refer to 5/29/24 [employer] message

## 2024-06-01 ENCOUNTER — HOSPITAL ENCOUNTER (OUTPATIENT)
Dept: MAMMOGRAPHY | Facility: HOSPITAL | Age: 49
Discharge: HOME | End: 2024-06-01
Attending: FAMILY MEDICINE
Payer: COMMERCIAL

## 2024-06-01 DIAGNOSIS — Z12.31 VISIT FOR SCREENING MAMMOGRAM: ICD-10-CM

## 2024-06-01 PROCEDURE — 77063 BREAST TOMOSYNTHESIS BI: CPT | Performed by: FAMILY MEDICINE

## 2024-06-01 PROCEDURE — 77067 SCR MAMMO BI INCL CAD: CPT | Performed by: FAMILY MEDICINE

## 2024-06-07 ENCOUNTER — HOSPITAL ENCOUNTER (OUTPATIENT)
Dept: MAMMOGRAPHY | Facility: HOSPITAL | Age: 49
Discharge: HOME | End: 2024-06-07
Attending: FAMILY MEDICINE
Payer: COMMERCIAL

## 2024-06-07 DIAGNOSIS — R92.8 ABNORMAL MAMMOGRAM: ICD-10-CM

## 2024-06-07 PROCEDURE — 77061 BREAST TOMOSYNTHESIS UNI: CPT | Performed by: FAMILY MEDICINE

## 2024-06-07 PROCEDURE — 77065 DX MAMMO INCL CAD UNI: CPT | Performed by: FAMILY MEDICINE

## 2024-06-20 DIAGNOSIS — F41.9 ANXIETY: ICD-10-CM

## 2024-06-20 DIAGNOSIS — E66.9 OBESITY (BMI 30-39.9): ICD-10-CM

## 2024-06-24 RX ORDER — TIRZEPATIDE 7.5 MG/.5ML
7.5 INJECTION, SOLUTION SUBCUTANEOUS WEEKLY
Qty: 2 ML | Refills: 0 | Status: SHIPPED | OUTPATIENT
Start: 2024-06-24

## 2024-06-24 NOTE — TELEPHONE ENCOUNTER
Please review. Protocol Failed; No Protocol    Requested Prescriptions   Pending Prescriptions Disp Refills    Tirzepatide-Weight Management (ZEPBOUND) 7.5 MG/0.5ML Subcutaneous Solution Auto-injector 2 mL 0     Sig: Inject 7.5 mg into the skin once a week.       There is no refill protocol information for this order            Future Appointments         Provider Department Appt Notes    In 2 months Zonia Delgado DO [facility], [facility] Annual Physical          Recent Outpatient Visits              2 months ago Acute conjunctivitis of right eye, unspecified acute conjunctivitis type    [facility], [facility], Lupe Elizabeth APN    Office Visit    3 months ago Obesity (BMI 30-39.9)    [facility] Zonia Delgado DO    Office Visit    7 months ago Severe obesity (BMI >= 40) (HCC)    [facility] Zonia Delgado DO    Office Visit    10 months ago Obesity (BMI 30-39.9)    [facility] Zonia Delgado DO    Office Visit    1 year ago Acute sinusitis, recurrence not specified, unspecified location    [facility] Matt Eddy Jr., AP[facility]    Office Visit

## 2024-08-17 ENCOUNTER — PATIENT MESSAGE (OUTPATIENT)
Facility: CLINIC | Age: 49
End: 2024-08-17

## 2024-08-17 DIAGNOSIS — E66.9 OBESITY (BMI 30-39.9): Primary | ICD-10-CM

## 2024-08-18 NOTE — TELEPHONE ENCOUNTER
Dr. Delgado, please advise. Patient requesting 12.5 mg Zepbound. Last dose ordered was 7.5 mg. Added pharmacy to chart.     From: Linda Braden  To: Zonia Delgado  Sent: 8/17/2024  9:39 AM CDT  Subject: Zeobound    Hi Dr. Delgado,  Would you please send a prescription to [facility] for the Zepbound 12.5?  I'd like it to be sent to the [facility] in [place] on [address] [facility] [address]. instead of [place].  I've had more success with getting it there.  Thank you.  Linda Braden

## 2024-08-19 ENCOUNTER — TELEPHONE (OUTPATIENT)
Facility: CLINIC | Age: 49
End: 2024-08-19

## 2024-08-19 DIAGNOSIS — E66.9 OBESITY (BMI 30-39.9): ICD-10-CM

## 2024-08-19 RX ORDER — TIRZEPATIDE 12.5 MG/.5ML
12.5 INJECTION, SOLUTION SUBCUTANEOUS WEEKLY
Qty: 2 ML | Refills: 0 | Status: SHIPPED | OUTPATIENT
Start: 2024-08-19 | End: 2024-09-10

## 2024-08-19 RX ORDER — ESOMEPRAZOLE MAGNESIUM 40 MG/1
40 CAPSULE, DELAYED RELEASE ORAL
Qty: 90 CAPSULE | Refills: 3 | Status: SHIPPED | OUTPATIENT
Start: 2024-08-19

## 2024-08-19 NOTE — TELEPHONE ENCOUNTER
Spoke to patient. She said that Dr. Delgado sent in [facility] and pharmacy is telling her she needs another Prior Authorization. She is not sure why this keeps happening.     Triage support- please assist.

## 2024-08-19 NOTE — TELEPHONE ENCOUNTER
Refill passed per [facility] protocol.  Requested Prescriptions   Pending Prescriptions Disp Refills    ESOMEPRAZOLE MAGNESIUM 40 MG Oral Capsule Delayed Release [Pharmacy Med Name: ESOMEPRAZOLE MAGNESIUM DR CAPS 40MG] 90 capsule 3     Sig: TAKE 1 CAPSULE BEFORE BREAKFAST       Gastrointestional Medication Protocol Passed - 8/13/2024 11:25 PM        Passed - In person appointment or virtual visit in the past 12 mos or appointment in next 3 mos     Recent Outpatient Visits              4 months ago Acute conjunctiv[facility] of right eye, unspecified [facility]iv[facility]    Office Visit    5 months ago Obesity (BMI 30-39.9)    [facility] Zonia Delgado DO    Office Visit    9 months ago Severe obesity (BMI >= 40) ([facility])    [facility] Zonia Delgado DO    Office Visit    12 months ago Obesity (BMI 30-39.9)    [facility] Zonia Delgado DO    Office Visit    1 year ago Acute sinusitis, recurrence not specified, unspecified location    [facility] [place] Her[facility] Jr., APN    Office Visit          Future Appointments         Provider Department Appt Notes    In 1 week Zonia Delgado DO [facility] Annual Physical  [employer] pos                       Future Appointments         Provider Department Appt Notes    In 1 week Zonia Delgado DO [facility] Annual Physical  [employer] pos          Recent Outpatient Visits              4 months ago Acute conjunctivitis of right eye, unspecified acute conjunctivitis type    [facility]    Office Visit    5 months ago Obesity (BMI 30-39.9)    [facility]     Office Visit    9 months ago Severe obesity (BMI >= 40) (HCC)    [facility] Zonia Delgado,     Office Visit    12 months ago Obesity (BMI 30-39.9)    [facility], [facility] Zonia Delgado,     Office Visit    1 year ago Acute sinusitis, recurrence not specified, unspecified location    [facility], Matt Eddy Jr., APN    Office Visit

## 2024-08-19 NOTE — TELEPHONE ENCOUNTER
Prior auth needed for Zepbound 12.5mg/0.5mL.  please call plan at [phone]  patient ID# [id number].

## 2024-08-20 NOTE — TELEPHONE ENCOUNTER
Approved    Prior authorization approved  Payer: [employer] Case ID: [id number]    [phone]    [phone]  Note from payer: CaseId:[id number];Status:Approved;Review Type:Prior Auth;Coverage Start Date:07/20/2024;Coverage End Date:04/16/2025;  Approval Details    Authorized from July 20, 2024 to April 16, 2025  Electronic appeal: Not supported  View History

## 2024-08-21 ENCOUNTER — PATIENT MESSAGE (OUTPATIENT)
Facility: CLINIC | Age: 49
End: 2024-08-21

## 2024-08-22 NOTE — TELEPHONE ENCOUNTER
From: Linda Braden  To: Zonia Delgado  Sent: 8/21/2024 11:13 AM CDT  Subject: Pre-Authorization     Hello,  I am following up on my call to the nurse a few days ago. I need to have another pre-authorization sent to my insurance so I can get my 12.5 Zepbound that was sent to [facility].   Has that been sent?  Thank you!   Linda Braden

## 2024-08-26 ENCOUNTER — OFFICE VISIT (OUTPATIENT)
Facility: CLINIC | Age: 49
End: 2024-08-26
Payer: COMMERCIAL

## 2024-08-26 ENCOUNTER — LAB ENCOUNTER (OUTPATIENT)
Dept: LAB | Facility: REFERENCE LAB | Age: 49
End: 2024-08-26
Attending: FAMILY MEDICINE
Payer: COMMERCIAL

## 2024-08-26 VITALS
SYSTOLIC BLOOD PRESSURE: 122 MMHG | WEIGHT: 218 LBS | HEIGHT: 70 IN | DIASTOLIC BLOOD PRESSURE: 78 MMHG | OXYGEN SATURATION: 99 % | BODY MASS INDEX: 31.21 KG/M2 | HEART RATE: 87 BPM

## 2024-08-26 DIAGNOSIS — Z00.00 ENCOUNTER FOR ROUTINE ADULT HEALTH EXAMINATION WITHOUT ABNORMAL FINDINGS: Primary | ICD-10-CM

## 2024-08-26 DIAGNOSIS — L65.9 HAIR LOSS: ICD-10-CM

## 2024-08-26 DIAGNOSIS — Z00.00 ENCOUNTER FOR ROUTINE ADULT HEALTH EXAMINATION WITHOUT ABNORMAL FINDINGS: ICD-10-CM

## 2024-08-26 DIAGNOSIS — Z12.11 COLON CANCER SCREENING: ICD-10-CM

## 2024-08-26 DIAGNOSIS — R00.2 PALPITATIONS: ICD-10-CM

## 2024-08-26 DIAGNOSIS — E66.9 OBESITY (BMI 30.0-34.9): ICD-10-CM

## 2024-08-26 DIAGNOSIS — L98.9 SKIN LESION OF BACK: ICD-10-CM

## 2024-08-26 PROBLEM — E66.811 OBESITY (BMI 30.0-34.9): Status: ACTIVE | Noted: 2023-11-20

## 2024-08-26 LAB
ALBUMIN SERPL-MCNC: 4.7 G/DL (ref 3.2–4.8)
ALBUMIN/GLOB SERPL: 1.8 {RATIO} (ref 1–2)
ALP LIVER SERPL-CCNC: 57 U/L
ALT SERPL-CCNC: 10 U/L
ANION GAP SERPL CALC-SCNC: 6 MMOL/L (ref 0–18)
AST SERPL-CCNC: 18 U/L (ref ?–34)
BASOPHILS # BLD AUTO: 0.03 X10(3) UL (ref 0–0.2)
BASOPHILS NFR BLD AUTO: 0.5 %
BILIRUB SERPL-MCNC: 0.3 MG/DL (ref 0.3–1.2)
BUN BLD-MCNC: 9 MG/DL (ref 9–23)
BUN/CREAT SERPL: 8.1 (ref 10–20)
CALCIUM BLD-MCNC: 9.8 MG/DL (ref 8.7–10.4)
CHLORIDE SERPL-SCNC: 107 MMOL/L (ref 98–112)
CHOLEST SERPL-MCNC: 241 MG/DL (ref ?–200)
CO2 SERPL-SCNC: 26 MMOL/L (ref 21–32)
CREAT BLD-MCNC: 1.11 MG/DL
DEPRECATED HBV CORE AB SER IA-ACNC: 6.5 NG/ML
DEPRECATED RDW RBC AUTO: 44.5 FL (ref 35.1–46.3)
EGFRCR SERPLBLD CKD-EPI 2021: 62 ML/MIN/1.73M2 (ref 60–?)
EOSINOPHIL # BLD AUTO: 0.07 X10(3) UL (ref 0–0.7)
EOSINOPHIL NFR BLD AUTO: 1.2 %
ERYTHROCYTE [DISTWIDTH] IN BLOOD BY AUTOMATED COUNT: 15.6 % (ref 11–15)
EST. AVERAGE GLUCOSE BLD GHB EST-MCNC: 100 MG/DL (ref 68–126)
FASTING PATIENT LIPID ANSWER: YES
FASTING STATUS PATIENT QL REPORTED: YES
GLOBULIN PLAS-MCNC: 2.6 G/DL (ref 2–3.5)
GLUCOSE BLD-MCNC: 91 MG/DL (ref 70–99)
HBA1C MFR BLD: 5.1 % (ref ?–5.7)
HCT VFR BLD AUTO: 38.8 %
HDLC SERPL-MCNC: 58 MG/DL (ref 40–59)
HGB BLD-MCNC: 12.3 G/DL
IMM GRANULOCYTES # BLD AUTO: 0.01 X10(3) UL (ref 0–1)
IMM GRANULOCYTES NFR BLD: 0.2 %
IRON SATN MFR SERPL: 4 %
IRON SERPL-MCNC: 16 UG/DL
LDLC SERPL CALC-MCNC: 167 MG/DL (ref ?–100)
LYMPHOCYTES # BLD AUTO: 0.95 X10(3) UL (ref 1–4)
LYMPHOCYTES NFR BLD AUTO: 16.6 %
MCH RBC QN AUTO: 25.2 PG (ref 26–34)
MCHC RBC AUTO-ENTMCNC: 31.7 G/DL (ref 31–37)
MCV RBC AUTO: 79.3 FL
MONOCYTES # BLD AUTO: 0.45 X10(3) UL (ref 0.1–1)
MONOCYTES NFR BLD AUTO: 7.8 %
NEUTROPHILS # BLD AUTO: 4.23 X10 (3) UL (ref 1.5–7.7)
NEUTROPHILS # BLD AUTO: 4.23 X10(3) UL (ref 1.5–7.7)
NEUTROPHILS NFR BLD AUTO: 73.7 %
NONHDLC SERPL-MCNC: 183 MG/DL (ref ?–130)
OSMOLALITY SERPL CALC.SUM OF ELEC: 286 MOSM/KG (ref 275–295)
PLATELET # BLD AUTO: 283 10(3)UL (ref 150–450)
POTASSIUM SERPL-SCNC: 4 MMOL/L (ref 3.5–5.1)
PROT SERPL-MCNC: 7.3 G/DL (ref 5.7–8.2)
RBC # BLD AUTO: 4.89 X10(6)UL
SODIUM SERPL-SCNC: 139 MMOL/L (ref 136–145)
TIBC SERPL-MCNC: 361 UG/DL (ref 250–425)
TRANSFERRIN SERPL-MCNC: 242 MG/DL (ref 250–380)
TRIGL SERPL-MCNC: 91 MG/DL (ref 30–149)
TSI SER-ACNC: 2.5 MIU/ML (ref 0.55–4.78)
VLDLC SERPL CALC-MCNC: 18 MG/DL (ref 0–30)
WBC # BLD AUTO: 5.7 X10(3) UL (ref 4–11)

## 2024-08-26 PROCEDURE — 85025 COMPLETE CBC W/AUTO DIFF WBC: CPT

## 2024-08-26 PROCEDURE — 83036 HEMOGLOBIN GLYCOSYLATED A1C: CPT

## 2024-08-26 PROCEDURE — 84466 ASSAY OF TRANSFERRIN: CPT

## 2024-08-26 PROCEDURE — 80061 LIPID PANEL: CPT

## 2024-08-26 PROCEDURE — 84443 ASSAY THYROID STIM HORMONE: CPT

## 2024-08-26 PROCEDURE — 80053 COMPREHEN METABOLIC PANEL: CPT

## 2024-08-26 PROCEDURE — 36415 COLL VENOUS BLD VENIPUNCTURE: CPT

## 2024-08-26 PROCEDURE — 82728 ASSAY OF FERRITIN: CPT

## 2024-08-26 PROCEDURE — 83540 ASSAY OF IRON: CPT

## 2024-08-26 NOTE — TELEPHONE ENCOUNTER
Patient called on status of NELSON needed.     Will forward a telephone encounter 8/19/24 to triage support.
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
## 2024-08-27 DIAGNOSIS — R79.89 ELEVATED SERUM CREATININE: Primary | ICD-10-CM

## 2024-11-15 ENCOUNTER — PATIENT MESSAGE (OUTPATIENT)
Facility: CLINIC | Age: 49
End: 2024-11-15

## 2024-11-15 DIAGNOSIS — E66.9 OBESITY (BMI 30-39.9): ICD-10-CM

## 2024-11-16 RX ORDER — TIRZEPATIDE 15 MG/.5ML
15 INJECTION, SOLUTION SUBCUTANEOUS WEEKLY
Qty: 6 ML | Refills: 1 | Status: SHIPPED | OUTPATIENT
Start: 2024-11-16

## 2024-11-16 NOTE — TELEPHONE ENCOUNTER
Dr Delgado --- see pended 15mg dose.   Correct pharmacy has been pended.     Please advise   Please respond directly to the patient if no additional staff support is required.

## 2025-04-04 ENCOUNTER — PATIENT MESSAGE (OUTPATIENT)
Facility: CLINIC | Age: 50
End: 2025-04-04

## 2025-05-02 ENCOUNTER — PATIENT MESSAGE (OUTPATIENT)
Facility: CLINIC | Age: 50
End: 2025-05-02

## 2025-05-02 DIAGNOSIS — E66.811 OBESITY (BMI 30.0-34.9): ICD-10-CM

## 2025-05-02 RX ORDER — TIRZEPATIDE 12.5 MG/.5ML
12.5 INJECTION, SOLUTION SUBCUTANEOUS WEEKLY
Qty: 6 ML | Refills: 0 | Status: SHIPPED | OUTPATIENT
Start: 2025-05-02

## 2025-05-02 NOTE — TELEPHONE ENCOUNTER
Spoke with patient and stated she will schedule the appointment using My chart .     No further action is needed.

## 2025-05-07 ENCOUNTER — TELEPHONE (OUTPATIENT)
Facility: CLINIC | Age: 50
End: 2025-05-07

## 2025-05-07 NOTE — TELEPHONE ENCOUNTER
Patient called (identified name and ),   Checking status of refill.  Informed prior authorization has been initiated, will need to hear back from insurance.  She stated understanding.  Next injection is due 2025.

## 2025-05-08 NOTE — TELEPHONE ENCOUNTER
Approved    Prior authorization approved  Payer: [employer] Case ID: [id number]    [phone]    [phone]  Note from payer: CaseId:[id number];Status:Approved;Review Type:Prior Auth;Coverage Start Date:04/07/2025;Coverage End Date:05/07/2026;  Approval Details    Authorized from April 7, 2025 to May 7, 2026      Patient notified via [employer].

## 2025-05-08 NOTE — TELEPHONE ENCOUNTER
Prior authorization for zepbound was done through [employer]. It can take up to 14 business days for a decision to come back

## 2025-06-14 ENCOUNTER — PATIENT MESSAGE (OUTPATIENT)
Facility: CLINIC | Age: 50
End: 2025-06-14

## 2025-06-16 NOTE — TELEPHONE ENCOUNTER
Patient is concerned about bruising on her legs and hair loss. She would like to see Dr. Delgado sooner to address these issues.     Future Appointments   Date Time Provider Department Center   6/20/2025 12:30 PM Zonia Delgado DO [facility]   8/25/2025 10:00 AM Zonia Delgado DO [facility] FP [facility]

## 2025-06-20 ENCOUNTER — LAB ENCOUNTER (OUTPATIENT)
Dept: LAB | Age: 50
End: 2025-06-20
Attending: FAMILY MEDICINE
Payer: COMMERCIAL

## 2025-06-20 ENCOUNTER — OFFICE VISIT (OUTPATIENT)
Facility: CLINIC | Age: 50
End: 2025-06-20
Payer: COMMERCIAL

## 2025-06-20 VITALS
WEIGHT: 168 LBS | BODY MASS INDEX: 24.05 KG/M2 | HEART RATE: 88 BPM | OXYGEN SATURATION: 97 % | DIASTOLIC BLOOD PRESSURE: 65 MMHG | HEIGHT: 70 IN | SYSTOLIC BLOOD PRESSURE: 120 MMHG

## 2025-06-20 DIAGNOSIS — L65.9 HAIR LOSS: ICD-10-CM

## 2025-06-20 DIAGNOSIS — E61.1 IRON DEFICIENCY: ICD-10-CM

## 2025-06-20 DIAGNOSIS — Z00.00 ROUTINE GENERAL MEDICAL EXAMINATION AT A HEALTH CARE FACILITY: ICD-10-CM

## 2025-06-20 DIAGNOSIS — T14.8XXA BRUISING: ICD-10-CM

## 2025-06-20 DIAGNOSIS — R79.89 ELEVATED SERUM CREATININE: ICD-10-CM

## 2025-06-20 DIAGNOSIS — L65.9 HAIR LOSS: Primary | ICD-10-CM

## 2025-06-20 PROBLEM — H93.8X2: Status: RESOLVED | Noted: 2020-02-14 | Resolved: 2025-06-20

## 2025-06-20 PROBLEM — E66.811 OBESITY (BMI 30.0-34.9): Status: RESOLVED | Noted: 2023-11-20 | Resolved: 2025-06-20

## 2025-06-20 LAB
ALBUMIN SERPL-MCNC: 4.6 G/DL (ref 3.2–4.8)
ALBUMIN/GLOB SERPL: 1.9 {RATIO} (ref 1–2)
ALP LIVER SERPL-CCNC: 49 U/L (ref 39–100)
ALT SERPL-CCNC: <7 U/L (ref 10–49)
ANION GAP SERPL CALC-SCNC: 7 MMOL/L (ref 0–18)
APTT PPP: 26.8 SECONDS (ref 23–36)
AST SERPL-CCNC: 20 U/L (ref ?–34)
BASOPHILS # BLD AUTO: 0.04 X10(3) UL (ref 0–0.2)
BASOPHILS NFR BLD AUTO: 0.6 %
BILIRUB SERPL-MCNC: 0.5 MG/DL (ref 0.3–1.2)
BUN BLD-MCNC: 13 MG/DL (ref 9–23)
BUN/CREAT SERPL: 11.2 (ref 10–20)
CALCIUM BLD-MCNC: 9.6 MG/DL (ref 8.7–10.4)
CHLORIDE SERPL-SCNC: 102 MMOL/L (ref 98–112)
CHOLEST SERPL-MCNC: 221 MG/DL (ref ?–200)
CO2 SERPL-SCNC: 29 MMOL/L (ref 21–32)
CREAT BLD-MCNC: 1.16 MG/DL (ref 0.55–1.02)
DEPRECATED HBV CORE AB SER IA-ACNC: 13 NG/ML (ref 50–306)
DEPRECATED RDW RBC AUTO: 40.6 FL (ref 35.1–46.3)
EGFRCR SERPLBLD CKD-EPI 2021: 58 ML/MIN/1.73M2 (ref 60–?)
EOSINOPHIL # BLD AUTO: 0.08 X10(3) UL (ref 0–0.7)
EOSINOPHIL NFR BLD AUTO: 1.3 %
ERYTHROCYTE [DISTWIDTH] IN BLOOD BY AUTOMATED COUNT: 12.8 % (ref 11–15)
EST. AVERAGE GLUCOSE BLD GHB EST-MCNC: 94 MG/DL (ref 68–126)
FASTING PATIENT LIPID ANSWER: NO
FASTING STATUS PATIENT QL REPORTED: NO
GLOBULIN PLAS-MCNC: 2.4 G/DL (ref 2–3.5)
GLUCOSE BLD-MCNC: 65 MG/DL (ref 70–99)
HBA1C MFR BLD: 4.9 % (ref ?–5.7)
HCT VFR BLD AUTO: 41.6 % (ref 35–48)
HDLC SERPL-MCNC: 82 MG/DL (ref 40–59)
HGB BLD-MCNC: 13.5 G/DL (ref 12–16)
IMM GRANULOCYTES # BLD AUTO: 0.04 X10(3) UL (ref 0–1)
IMM GRANULOCYTES NFR BLD: 0.6 %
INR BLD: 1.06 (ref 0.8–1.2)
LDLC SERPL CALC-MCNC: 129 MG/DL (ref ?–100)
LYMPHOCYTES # BLD AUTO: 1.44 X10(3) UL (ref 1–4)
LYMPHOCYTES NFR BLD AUTO: 22.6 %
MCH RBC QN AUTO: 28.1 PG (ref 26–34)
MCHC RBC AUTO-ENTMCNC: 32.5 G/DL (ref 31–37)
MCV RBC AUTO: 86.7 FL (ref 80–100)
MONOCYTES # BLD AUTO: 0.53 X10(3) UL (ref 0.1–1)
MONOCYTES NFR BLD AUTO: 8.3 %
NEUTROPHILS # BLD AUTO: 4.25 X10 (3) UL (ref 1.5–7.7)
NEUTROPHILS # BLD AUTO: 4.25 X10(3) UL (ref 1.5–7.7)
NEUTROPHILS NFR BLD AUTO: 66.6 %
NONHDLC SERPL-MCNC: 139 MG/DL (ref ?–130)
OSMOLALITY SERPL CALC.SUM OF ELEC: 284 MOSM/KG (ref 275–295)
PLATELET # BLD AUTO: 281 10(3)UL (ref 150–450)
POTASSIUM SERPL-SCNC: 4.1 MMOL/L (ref 3.5–5.1)
PROT SERPL-MCNC: 7 G/DL (ref 5.7–8.2)
PROTHROMBIN TIME: 14.4 SECONDS (ref 11.6–14.8)
RBC # BLD AUTO: 4.8 X10(6)UL (ref 3.8–5.3)
SODIUM SERPL-SCNC: 138 MMOL/L (ref 136–145)
TRIGL SERPL-MCNC: 55 MG/DL (ref 30–149)
TSI SER-ACNC: 1.07 UIU/ML (ref 0.55–4.78)
VLDLC SERPL CALC-MCNC: 10 MG/DL (ref 0–30)
WBC # BLD AUTO: 6.4 X10(3) UL (ref 4–11)

## 2025-06-20 PROCEDURE — 83036 HEMOGLOBIN GLYCOSYLATED A1C: CPT

## 2025-06-20 PROCEDURE — 36415 COLL VENOUS BLD VENIPUNCTURE: CPT

## 2025-06-20 PROCEDURE — 85610 PROTHROMBIN TIME: CPT

## 2025-06-20 PROCEDURE — 84443 ASSAY THYROID STIM HORMONE: CPT

## 2025-06-20 PROCEDURE — 80061 LIPID PANEL: CPT

## 2025-06-20 PROCEDURE — 99214 OFFICE O/P EST MOD 30 MIN: CPT | Performed by: FAMILY MEDICINE

## 2025-06-20 PROCEDURE — 85730 THROMBOPLASTIN TIME PARTIAL: CPT

## 2025-06-20 PROCEDURE — 82728 ASSAY OF FERRITIN: CPT

## 2025-06-20 PROCEDURE — 80053 COMPREHEN METABOLIC PANEL: CPT

## 2025-06-20 PROCEDURE — 85025 COMPLETE CBC W/AUTO DIFF WBC: CPT

## 2025-06-20 RX ORDER — MAGNESIUM OXIDE 400 MG (241.3 MG MAGNESIUM) TABLET
1 TABLET EVERY OTHER DAY
COMMUNITY

## 2025-06-20 NOTE — PROGRESS NOTES
CC:    Chief Complaint   Patient presents with    Hair/Scalp Problem     Pt C/o hair loss.     Bruising     Pt C/o bruising that started on the legs but now moving up towards the arms.       HPI: 50 year old female here with hair loss and easy bruising.  Started noticing a lot of hair loss in October, but it has been severe over the last four months.   Feels her weight loss has been steady since she started Zepbound.  Saw a dermatologist for this, and she recommended a shampoo for hair growth without much improvement.  She is also taking iron, zinc, a biotin hair loss vitamin, and vitamin d.  She does notice some hair growing back, but then it falls out okay.   Feels low energy, and has night sweats leading up to her cycles.  Has a menstrual cycle monthly.   Started noticed easy bruising about 1-2 months ago, but it has continued.  Will get 15-20 bruises at a time, and they present more yellow than black and blue.  She is not injuring herself to explain the bruising.   Not having this on her arms and thumbs as well.   Has been on Aspirin 325 mg daily since age 39 for previous pulmonary embolism, but never  has had bruising like this.   Does not take NSAID's regularly, and only takes Tylenol when needed.   Needs to drink more water as she is not drinking enough.   Not eating as much right after she takes her Zepbound, but does try to eat two meals a day.     ROS:  General:  No fever, fatigue, intentional weight loss, intermittent night sweats   HEENT:  Denies congestion or nasal discharge  Cardio:  No chest pain  Pulmonary:  No cough, no SOB  GI:  No N/V/D, constipation, no hematochezia or melena   :  No discharge, no dysuria, no polyuria, no hematuria  Dermatologic:  No rashes but increased bruising, hair loss  Neuro: intermittent lightheadedness     Past Medical History[1]    Social History     Socioeconomic History    Marital status:      Spouse name: Not on file    Number of children: Not on file     Years of education: Not on file    Highest education level: Not on file   Occupational History     Employer: [employer]   Tobacco Use    Smoking status: Former     Current packs/day: 0.00     Types: Cigarettes     Start date: 1992     Quit date: 2008     Years since quittin.6    Smokeless tobacco: Never    Tobacco comments:     No Household Smokers.    Vaping Use    Vaping status: Never Used   Substance and Sexual Activity    Alcohol use: Yes     Alcohol/week: 5.0 standard drinks of alcohol     Types: 5 Standard drinks or equivalent per week     Comment: Davide    Drug use: No    Sexual activity: Yes     Partners: Male     Birth control/protection: Vasectomy   Other Topics Concern     Service Not Asked    Blood Transfusions Not Asked    Caffeine Concern No    Occupational Exposure Not Asked    Hobby Hazards Not Asked    Sleep Concern Not Asked    Stress Concern Yes    Weight Concern Yes    Special Diet Not Asked    Back Care Not Asked    Exercise Yes    Bike Helmet Not Asked    Seat Belt Yes    Self-Exams Not Asked   Social History Narrative    The patient does not use an assistive device..      The patient does live in a home with stairs. 18 stairs     Social Drivers of Health     Food Insecurity: No Food Insecurity (3/22/2025)    Received from [facility]    Hunger Vital Sign     Worried About Running Out of Food in the Last Year: Never true     Ran Out of Food in the Last Year: Never true   Transportation Needs: No Transportation Needs (3/22/2025)    Received from [facility]    PRAPARE - Transportation     Lack of Transportation (Medical): No     Lack of Transportation (Non-Medical): No   Stress: Not on file   Housing Stability: Unknown (3/22/2025)    Received from [facility]    Housing Stability Vital Sign     Unable to Pay for Housing in the Last Year: No     Number of Times Moved in the Last Year: Not  on file     Homeless in the Last Year: Not on file       Current Medications[2]    Patient has no known allergies.      Vitals:   Vitals:    06/20/25 1225   BP: 120/65   Pulse: 88   SpO2: 97%   Weight: 168 lb (76.2 kg)   Height: 5' 10\" (1.778 m)       Body mass index is 24.11 kg/m².    Physical:  General:  Alert, appropriate, no acute distress   HEENT: supple, no lymphadenopathy   Cardio:  RRR, no murmurs, S1, S2  Pulmonary:  Clear bilaterally, good air entry  Dermatologic: Ecchymoses with yellow discoloration in later stages of healing. No petechiae or purpura.     Assessment and Plan: 50-year-old female here for evaluation of hair loss and easy bruising.    1. Hair loss    -Likely due to weight loss from Zepbound and telogen effluvium, and discussed this is self-limiting but can take 6 to 12 months to improve  - Also discussed importance of increasing protein in her diet and ensuring she is getting enough nutrients  - Will check ferritin and TSH as well to rule out other causes, and consider topical versus oral medications to help pending results  - TSH W Reflex To Free T4 [E]; Future    2. Bruising    - Check CBC and clotting factors, though may be related to high-dose aspirin  - Prothrombin Time (PT) [E]; Future  - PTT, Activated [E]; Future    3. Routine general medical examination at a health care facility    - CBC W Differential W Platelet [E]; Future  - Comp Metabolic Panel (14) [E]; Future  - Lipid Panel [E]; Future  - Hemoglobin A1C (Glycohemoglobin) [E]; Future    4. Iron deficiency    - Ferritin [E]; Future      Zonia Delgado DO  06/20/25  12:41 PM         [1]   Past Medical History:   Allergic rhinitis    Anxiety state, unspecified    Depression    Dysmenorrhea    Painful Cramping    Esophageal reflux    Gestational diabetes (HCC)    2010, 2013    Hip pain, bilateral    Lower back pain    Obesity    Polycystic ovary syndrome    on meds    Pulmonary embolism (HCC)    Sleep apnea   [2]   Current Outpatient  Medications   Medication Sig Dispense Refill    Ferrous Sulfate ER (SLOW FE) 45 MG Oral Tab CR Take 1 tablet by mouth every other day.      Tirzepatide-Weight Management (ZEPBOUND) 12.5 MG/0.5ML Subcutaneous Solution Auto-injector Inject 12.5 mg into the skin once a week. 6 mL 0    Esomeprazole Magnesium 40 MG Oral Capsule Delayed Release Take 1 capsule (40 mg total) by mouth before breakfast. 90 capsule 3    ARIPiprazole 10 MG Oral Tab Take 1 tablet (10 mg total) by mouth every morning.      buPROPion  MG Oral Tablet 24 Hr Take 1 tablet (150 mg total) by mouth every morning.      sertraline 100 MG Oral Tab Take 2 tablets (200 mg total) by mouth in the morning.      lamoTRIgine 200 MG Oral Tab Take 1 tablet (200 mg total) by mouth 2 (two) times daily. 180 tablet 0    Probiotic Product (PROBIOTIC OR) Take by mouth.      aspirin 325 MG Oral Tab Take 1 tablet (325 mg total) by mouth daily.      ALPRAZolam 0.5 MG Oral Tab

## 2025-07-18 ENCOUNTER — PATIENT MESSAGE (OUTPATIENT)
Facility: CLINIC | Age: 50
End: 2025-07-18

## 2025-07-19 NOTE — TELEPHONE ENCOUNTER
Tj Mckeon,     Your creatinine level actually came back a little higher, and your GFR a little lower.  How much water do think you drink in a day?  And have you been avoiding all NSAID medications?  Let me know when you can.     Dr. Delgado   Written by Zonia Delgado DO on 7/18/2025  2:17 PM CDT  Seen by patient Linda Braden on 7/18/2025  2:20 PM

## 2025-07-28 ENCOUNTER — HOSPITAL ENCOUNTER (OUTPATIENT)
Dept: ULTRASOUND IMAGING | Facility: HOSPITAL | Age: 50
Discharge: HOME | End: 2025-07-28
Attending: FAMILY MEDICINE
Payer: COMMERCIAL

## 2025-07-28 DIAGNOSIS — R79.89 ELEVATED SERUM CREATININE: ICD-10-CM

## 2025-07-28 PROCEDURE — 76775 US EXAM ABDO BACK WALL LIM: CPT | Performed by: FAMILY MEDICINE

## 2025-07-29 ENCOUNTER — RESULTS FOLLOW-UP (OUTPATIENT)
Facility: CLINIC | Age: 50
End: 2025-07-29

## 2025-08-11 ENCOUNTER — PATIENT MESSAGE (OUTPATIENT)
Facility: CLINIC | Age: 50
End: 2025-08-11

## 2025-08-12 ENCOUNTER — NURSE TRIAGE (OUTPATIENT)
Facility: CLINIC | Age: 50
End: 2025-08-12

## 2025-08-12 ENCOUNTER — HOSPITAL ENCOUNTER (EMERGENCY)
Facility: HOSPITAL | Age: 50
Discharge: HOME | End: 2025-08-12
Attending: EMERGENCY MEDICINE

## 2025-08-12 VITALS
DIASTOLIC BLOOD PRESSURE: 80 MMHG | HEART RATE: 89 BPM | BODY MASS INDEX: 24.34 KG/M2 | SYSTOLIC BLOOD PRESSURE: 117 MMHG | RESPIRATION RATE: 16 BRPM | WEIGHT: 170 LBS | TEMPERATURE: 98 F | HEIGHT: 70 IN | OXYGEN SATURATION: 98 %

## 2025-08-12 DIAGNOSIS — R00.2 PALPITATIONS: Primary | ICD-10-CM

## 2025-08-12 LAB
ANION GAP SERPL CALC-SCNC: 4 MMOL/L (ref 0–18)
BASOPHILS # BLD AUTO: 0.04 X10(3) UL (ref 0–0.2)
BASOPHILS NFR BLD AUTO: 0.5 %
BUN BLD-MCNC: 9 MG/DL (ref 9–23)
BUN/CREAT SERPL: 7.7 (ref 10–20)
CALCIUM BLD-MCNC: 9.3 MG/DL (ref 8.7–10.4)
CHLORIDE SERPL-SCNC: 102 MMOL/L (ref 98–112)
CO2 SERPL-SCNC: 30 MMOL/L (ref 21–32)
CREAT BLD-MCNC: 1.17 MG/DL (ref 0.55–1.02)
DEPRECATED RDW RBC AUTO: 39.8 FL (ref 35.1–46.3)
EGFRCR SERPLBLD CKD-EPI 2021: 58 ML/MIN/1.73M2 (ref 60–?)
EOSINOPHIL # BLD AUTO: 0.09 X10(3) UL (ref 0–0.7)
EOSINOPHIL NFR BLD AUTO: 1.2 %
ERYTHROCYTE [DISTWIDTH] IN BLOOD BY AUTOMATED COUNT: 12.8 % (ref 11–15)
GLUCOSE BLD-MCNC: 83 MG/DL (ref 70–99)
HCT VFR BLD AUTO: 40.4 % (ref 35–48)
HGB BLD-MCNC: 13.5 G/DL (ref 12–16)
IMM GRANULOCYTES # BLD AUTO: 0.01 X10(3) UL (ref 0–1)
IMM GRANULOCYTES NFR BLD: 0.1 %
LYMPHOCYTES # BLD AUTO: 2.01 X10(3) UL (ref 1–4)
LYMPHOCYTES NFR BLD AUTO: 26.7 %
MCH RBC QN AUTO: 28.2 PG (ref 26–34)
MCHC RBC AUTO-ENTMCNC: 33.4 G/DL (ref 31–37)
MCV RBC AUTO: 84.3 FL (ref 80–100)
MONOCYTES # BLD AUTO: 0.55 X10(3) UL (ref 0.1–1)
MONOCYTES NFR BLD AUTO: 7.3 %
NEUTROPHILS # BLD AUTO: 4.83 X10 (3) UL (ref 1.5–7.7)
NEUTROPHILS # BLD AUTO: 4.83 X10(3) UL (ref 1.5–7.7)
NEUTROPHILS NFR BLD AUTO: 64.2 %
OSMOLALITY SERPL CALC.SUM OF ELEC: 280 MOSM/KG (ref 275–295)
PLATELET # BLD AUTO: 249 10(3)UL (ref 150–450)
POTASSIUM SERPL-SCNC: 3.7 MMOL/L (ref 3.5–5.1)
RBC # BLD AUTO: 4.79 X10(6)UL (ref 3.8–5.3)
SODIUM SERPL-SCNC: 136 MMOL/L (ref 136–145)
TROPONIN I SERPL HS-MCNC: <3 NG/L (ref ?–34)
TSI SER-ACNC: 2.12 UIU/ML (ref 0.55–4.78)
WBC # BLD AUTO: 7.5 X10(3) UL (ref 4–11)

## 2025-08-12 PROCEDURE — 84484 ASSAY OF TROPONIN QUANT: CPT | Performed by: EMERGENCY MEDICINE

## 2025-08-12 PROCEDURE — 93005 ELECTROCARDIOGRAM TRACING: CPT

## 2025-08-12 PROCEDURE — 36415 COLL VENOUS BLD VENIPUNCTURE: CPT

## 2025-08-12 PROCEDURE — 85025 COMPLETE CBC W/AUTO DIFF WBC: CPT | Performed by: EMERGENCY MEDICINE

## 2025-08-12 PROCEDURE — 93010 ELECTROCARDIOGRAM REPORT: CPT

## 2025-08-12 PROCEDURE — 99284 EMERGENCY DEPT VISIT MOD MDM: CPT

## 2025-08-12 PROCEDURE — 80048 BASIC METABOLIC PNL TOTAL CA: CPT | Performed by: EMERGENCY MEDICINE

## 2025-08-12 PROCEDURE — 84443 ASSAY THYROID STIM HORMONE: CPT | Performed by: EMERGENCY MEDICINE

## 2025-08-13 LAB
ATRIAL RATE: 87 BPM
P AXIS: 58 DEGREES
P-R INTERVAL: 170 MS
Q-T INTERVAL: 360 MS
QRS DURATION: 82 MS
QTC CALCULATION (BEZET): 433 MS
R AXIS: 15 DEGREES
T AXIS: 54 DEGREES
VENTRICULAR RATE: 87 BPM

## 2025-08-13 RX ORDER — ESOMEPRAZOLE MAGNESIUM 40 MG/1
40 CAPSULE, DELAYED RELEASE ORAL
Qty: 90 CAPSULE | Refills: 3 | Status: SHIPPED | OUTPATIENT
Start: 2025-08-13

## 2025-08-25 ENCOUNTER — OFFICE VISIT (OUTPATIENT)
Facility: CLINIC | Age: 50
End: 2025-08-25

## 2025-08-25 VITALS
SYSTOLIC BLOOD PRESSURE: 118 MMHG | HEIGHT: 70 IN | WEIGHT: 175 LBS | HEART RATE: 88 BPM | BODY MASS INDEX: 25.05 KG/M2 | DIASTOLIC BLOOD PRESSURE: 82 MMHG | OXYGEN SATURATION: 98 %

## 2025-08-25 DIAGNOSIS — Z12.11 COLON CANCER SCREENING: ICD-10-CM

## 2025-08-25 DIAGNOSIS — R00.2 PALPITATIONS: ICD-10-CM

## 2025-08-25 DIAGNOSIS — L65.9 HAIR LOSS: ICD-10-CM

## 2025-08-25 DIAGNOSIS — E66.9 OBESITY (BMI 30-39.9): ICD-10-CM

## 2025-08-25 DIAGNOSIS — R53.83 FATIGUE, UNSPECIFIED TYPE: ICD-10-CM

## 2025-08-25 DIAGNOSIS — Z00.00 ENCOUNTER FOR ROUTINE ADULT HEALTH EXAMINATION WITHOUT ABNORMAL FINDINGS: Primary | ICD-10-CM

## 2025-08-25 DIAGNOSIS — E61.1 IRON DEFICIENCY: ICD-10-CM

## 2025-08-25 PROBLEM — N92.0 MENORRHAGIA WITH REGULAR CYCLE: Status: RESOLVED | Noted: 2022-12-12 | Resolved: 2025-08-25

## 2025-08-25 PROCEDURE — 99213 OFFICE O/P EST LOW 20 MIN: CPT | Performed by: FAMILY MEDICINE

## 2025-08-25 PROCEDURE — 99396 PREV VISIT EST AGE 40-64: CPT | Performed by: FAMILY MEDICINE

## 2025-08-25 RX ORDER — SERTRALINE HYDROCHLORIDE 200 MG/1
200 CAPSULE ORAL DAILY
COMMUNITY
Start: 2025-06-01

## 2025-08-25 RX ORDER — BUPROPION HYDROCHLORIDE 300 MG/1
300 TABLET ORAL EVERY MORNING
COMMUNITY
Start: 2025-07-11

## (undated) NOTE — LETTER
AUTHORIZATION FOR SURGICAL OPERATION OR OTHER PROCEDURE    1.  I hereby authorize Dr. Marika Avila and [facility] staff assigned to my case to perform the following operation and/or procedure at the [facility]:    Cortisone injection in [place] Time:  ________ A. M.  P.M.        Patient Name:  ______________________________________________________  (please print)      Patient signature:  ___________________________________________________             Relationship to Patient:           []  Parent

## (undated) NOTE — LETTER
5/8/2019          To Whom It May Concern:    Tutu Contreras is currently under my medical care. Please excuse her spouse, Cb Shaver, from work through 5/14/2019 as he is caring for his wife after an injury.      If you require additional informatio

## (undated) NOTE — MR AVS SNAPSHOT
[facility] [address] [place] [place] [id number]  [phone]               Thank you for choosing us for your health care visit with Heri Khan DO. We are glad to serve you and happy to provide you with this summary of your visit.   Please he 112/74 mmHg 92 98.4 °F (36.9 °C) (Oral) 5' 10\" (1.778 m) 257 lb (116.574 kg) 36.88 kg/m2    Breastfeeding?                    No              Current Medications          This list is accurate as of: 5/31/17  9:20 PM.  Always use your most recent med list Healthy Diet and Regular Exercise  [employer] [address] [place], [place] [address] [facility] for making healthy food choices  -   Enjoy your food, but eat less. Fully enjoy your food when eating. Don’t eat while distracted and slow down. Avoid over sized portions.    Josiane Mcguire

## (undated) NOTE — ED AVS SNAPSHOT
Talia Byrne   MRN: [id number]    Department:  [facility] Emergency Department   Date of Visit:  5/4/2019           Disclosure     Insurance plans vary and the physician(s) referred by the ER may not be covered by your plan.  Please contact CARE PHYSICIAN AT ONCE OR RETURN IMMEDIATELY TO THE EMERGENCY DEPARTMENT. If you have been prescribed any medication(s), please fill your prescription right away and begin taking the medication(s) as directed.   If you believe that any of the medications